# Patient Record
Sex: MALE | Race: OTHER | NOT HISPANIC OR LATINO | URBAN - METROPOLITAN AREA
[De-identification: names, ages, dates, MRNs, and addresses within clinical notes are randomized per-mention and may not be internally consistent; named-entity substitution may affect disease eponyms.]

---

## 2018-03-26 ENCOUNTER — EMERGENCY (EMERGENCY)
Facility: HOSPITAL | Age: 33
LOS: 1 days | Discharge: ANOTHER HOSPITAL | End: 2018-03-26
Attending: EMERGENCY MEDICINE | Admitting: EMERGENCY MEDICINE
Payer: MEDICAID

## 2018-03-26 VITALS
DIASTOLIC BLOOD PRESSURE: 83 MMHG | TEMPERATURE: 98 F | OXYGEN SATURATION: 99 % | RESPIRATION RATE: 18 BRPM | SYSTOLIC BLOOD PRESSURE: 153 MMHG | HEART RATE: 111 BPM

## 2018-03-26 DIAGNOSIS — Z95.810 PRESENCE OF AUTOMATIC (IMPLANTABLE) CARDIAC DEFIBRILLATOR: Chronic | ICD-10-CM

## 2018-03-26 DIAGNOSIS — Z95.0 PRESENCE OF CARDIAC PACEMAKER: Chronic | ICD-10-CM

## 2018-03-26 LAB
ALBUMIN SERPL ELPH-MCNC: 4.1 G/DL — SIGNIFICANT CHANGE UP (ref 3.3–5)
ALP SERPL-CCNC: 101 U/L — SIGNIFICANT CHANGE UP (ref 40–120)
ALT FLD-CCNC: 50 U/L — HIGH (ref 4–41)
AST SERPL-CCNC: 30 U/L — SIGNIFICANT CHANGE UP (ref 4–40)
BASOPHILS # BLD AUTO: 0.05 K/UL — SIGNIFICANT CHANGE UP (ref 0–0.2)
BASOPHILS NFR BLD AUTO: 0.4 % — SIGNIFICANT CHANGE UP (ref 0–2)
BILIRUB SERPL-MCNC: 0.3 MG/DL — SIGNIFICANT CHANGE UP (ref 0.2–1.2)
BUN SERPL-MCNC: 18 MG/DL — SIGNIFICANT CHANGE UP (ref 7–23)
CALCIUM SERPL-MCNC: 9.5 MG/DL — SIGNIFICANT CHANGE UP (ref 8.4–10.5)
CHLORIDE SERPL-SCNC: 99 MMOL/L — SIGNIFICANT CHANGE UP (ref 98–107)
CK MB BLD-MCNC: 0.8 — SIGNIFICANT CHANGE UP (ref 0–2.5)
CK MB BLD-MCNC: 2.17 NG/ML — SIGNIFICANT CHANGE UP (ref 1–6.6)
CK SERPL-CCNC: 269 U/L — HIGH (ref 30–200)
CO2 SERPL-SCNC: 22 MMOL/L — SIGNIFICANT CHANGE UP (ref 22–31)
CREAT SERPL-MCNC: 1.15 MG/DL — SIGNIFICANT CHANGE UP (ref 0.5–1.3)
EOSINOPHIL # BLD AUTO: 0.1 K/UL — SIGNIFICANT CHANGE UP (ref 0–0.5)
EOSINOPHIL NFR BLD AUTO: 0.7 % — SIGNIFICANT CHANGE UP (ref 0–6)
GLUCOSE SERPL-MCNC: 143 MG/DL — HIGH (ref 70–99)
HCT VFR BLD CALC: 47.2 % — SIGNIFICANT CHANGE UP (ref 39–50)
HGB BLD-MCNC: 15.5 G/DL — SIGNIFICANT CHANGE UP (ref 13–17)
IMM GRANULOCYTES # BLD AUTO: 0.06 # — SIGNIFICANT CHANGE UP
IMM GRANULOCYTES NFR BLD AUTO: 0.4 % — SIGNIFICANT CHANGE UP (ref 0–1.5)
LYMPHOCYTES # BLD AUTO: 2.81 K/UL — SIGNIFICANT CHANGE UP (ref 1–3.3)
LYMPHOCYTES # BLD AUTO: 20 % — SIGNIFICANT CHANGE UP (ref 13–44)
MAGNESIUM SERPL-MCNC: 1.9 MG/DL — SIGNIFICANT CHANGE UP (ref 1.6–2.6)
MCHC RBC-ENTMCNC: 27 PG — SIGNIFICANT CHANGE UP (ref 27–34)
MCHC RBC-ENTMCNC: 32.8 % — SIGNIFICANT CHANGE UP (ref 32–36)
MCV RBC AUTO: 82.1 FL — SIGNIFICANT CHANGE UP (ref 80–100)
MONOCYTES # BLD AUTO: 0.85 K/UL — SIGNIFICANT CHANGE UP (ref 0–0.9)
MONOCYTES NFR BLD AUTO: 6 % — SIGNIFICANT CHANGE UP (ref 2–14)
NEUTROPHILS # BLD AUTO: 10.19 K/UL — HIGH (ref 1.8–7.4)
NEUTROPHILS NFR BLD AUTO: 72.5 % — SIGNIFICANT CHANGE UP (ref 43–77)
NRBC # FLD: 0 — SIGNIFICANT CHANGE UP
PHOSPHATE SERPL-MCNC: 2.7 MG/DL — SIGNIFICANT CHANGE UP (ref 2.5–4.5)
PLATELET # BLD AUTO: 295 K/UL — SIGNIFICANT CHANGE UP (ref 150–400)
PMV BLD: 10.8 FL — SIGNIFICANT CHANGE UP (ref 7–13)
POTASSIUM SERPL-MCNC: 4.1 MMOL/L — SIGNIFICANT CHANGE UP (ref 3.5–5.3)
POTASSIUM SERPL-SCNC: 4.1 MMOL/L — SIGNIFICANT CHANGE UP (ref 3.5–5.3)
PROT SERPL-MCNC: 8 G/DL — SIGNIFICANT CHANGE UP (ref 6–8.3)
RBC # BLD: 5.75 M/UL — SIGNIFICANT CHANGE UP (ref 4.2–5.8)
RBC # FLD: 12.1 % — SIGNIFICANT CHANGE UP (ref 10.3–14.5)
SODIUM SERPL-SCNC: 138 MMOL/L — SIGNIFICANT CHANGE UP (ref 135–145)
TROPONIN T SERPL-MCNC: < 0.06 NG/ML — SIGNIFICANT CHANGE UP (ref 0–0.06)
WBC # BLD: 14.06 K/UL — HIGH (ref 3.8–10.5)
WBC # FLD AUTO: 14.06 K/UL — HIGH (ref 3.8–10.5)

## 2018-03-26 PROCEDURE — 71045 X-RAY EXAM CHEST 1 VIEW: CPT | Mod: 26

## 2018-03-26 PROCEDURE — 99285 EMERGENCY DEPT VISIT HI MDM: CPT

## 2018-03-26 RX ORDER — METOPROLOL TARTRATE 50 MG
5 TABLET ORAL ONCE
Qty: 0 | Refills: 0 | Status: COMPLETED | OUTPATIENT
Start: 2018-03-26 | End: 2018-03-26

## 2018-03-26 RX ADMIN — Medication 5 MILLIGRAM(S): at 20:14

## 2018-03-26 NOTE — ED PROVIDER NOTE - ATTENDING CONTRIBUTION TO CARE
DR. HOPPER, ATTENDING MD-  I performed a face to face bedside interview with patient regarding history of present illness, review of symptoms and past medical history. I completed an independent physical exam.  I have discussed patient's plan of care with the resident.   Documentation as above in the note.    Tomas: h/o cardiac myopathy of unclear etiology, "scarring" per patient, dx'ed on MRI after having PVCs seen on EKG 3 years ago (ARVD?), was in [facility] until ~ 1 month ago when his aicd fired.  Was seen by his cardiologist who put him on an outpatient monitor.  Had not fired again until today when it fired at home once, then on the way to the hospital.  No dizziness prior to or after the firing.  Did have some "palpitations" mild chest discomfrot prior to event.  EKG shows bigeminy, but otherwise no ishchemic changes.  Vtial signs otherwise stable.  On exam, comfortable, lungs clear, heart RRR, abd soft.  PlaN: check labs, including electrolytes and cardiac enzymes, cards eval, admit tele

## 2018-03-26 NOTE — ED PROVIDER NOTE - OBJECTIVE STATEMENT
32M PMH AICD/Pacemaker ([employer]) placed in 2012 for runs of V-tach ultimately felt to be caused by cardiac scar tissue of unknown etiology, followed at [facility], presenting after AICD fired x 2 today. Denies any preceeding symptoms. Takes metoprolol and lisinopril daily. AICD fired approx 1 month ago and underwent holter monitor and found to have had runs of vtach resulting in firing. No recent illness/fever/chills/chest pain/SOB. Denies any drug use, EtOH or other medical history.

## 2018-03-26 NOTE — ED PROVIDER NOTE - MEDICAL DECISION MAKING DETAILS
32M s/p AICD firing, currently in and out of [facility], well appearing with normal exam. Labs, mag/phos, cardiac enzymes, cxr, ekg, tele monitoring, tele admission.

## 2018-03-26 NOTE — ED ADULT NURSE NOTE - OBJECTIVE STATEMENT
Pt rec't to ED TR B Aox4, in NAD, c/o AICD firing x2 in past 40 minutes, while sitting in car. Endorses anxiety at this time, denies any CP/ SOB/; N/V/ cough/ recent illnesses/ other acute complaints. EKG complete, NSR-sinus tach on CM, respirations appear even and unlabored, IV inserted, BW collected and sent to lab, metoprolol administered as per EMAR. Awaiting test results. Will continue to monitor.

## 2018-03-26 NOTE — ED PROVIDER NOTE - SHIFT CHANGE DETAILS
awaiting bed at [facility].  all appropriate information has been transmitted to that hospital, discharge paperwork prepared. awaiting bed

## 2018-03-27 VITALS — SYSTOLIC BLOOD PRESSURE: 110 MMHG | DIASTOLIC BLOOD PRESSURE: 75 MMHG | HEART RATE: 76 BPM

## 2022-06-25 ENCOUNTER — EMERGENCY (EMERGENCY)
Facility: HOSPITAL | Age: 37
LOS: 1 days | Discharge: HOME | End: 2022-06-25
Attending: EMERGENCY MEDICINE
Payer: MEDICAID

## 2022-06-25 VITALS
DIASTOLIC BLOOD PRESSURE: 75 MMHG | OXYGEN SATURATION: 99 % | HEIGHT: 69 IN | RESPIRATION RATE: 18 BRPM | SYSTOLIC BLOOD PRESSURE: 116 MMHG | WEIGHT: 175.05 LBS | TEMPERATURE: 98 F | HEART RATE: 68 BPM

## 2022-06-25 DIAGNOSIS — Z95.810 PRESENCE OF AUTOMATIC (IMPLANTABLE) CARDIAC DEFIBRILLATOR: Chronic | ICD-10-CM

## 2022-06-25 DIAGNOSIS — Z95.0 PRESENCE OF CARDIAC PACEMAKER: Chronic | ICD-10-CM

## 2022-06-25 PROCEDURE — 93010 ELECTROCARDIOGRAM REPORT: CPT

## 2022-06-25 PROCEDURE — 99285 EMERGENCY DEPT VISIT HI MDM: CPT

## 2022-06-25 NOTE — ED PROVIDER NOTE - CLINICAL SUMMARY MEDICAL DECISION MAKING FREE TEXT BOX
35yo male v tach s/p ablation p/w palpitations. EKg with occasional PVC's. Eval for underlying electrolyte abnormality vs ACS. Tele monitoring, labs, reassess.

## 2022-06-25 NOTE — ED PROVIDER NOTE - NSICDXPASTSURGICALHX_GEN_ALL_CORE_FT
PAST SURGICAL HISTORY:  AICD (automatic cardioverter/defibrillator) present [employer] placed 6/2015    Pacemaker

## 2022-06-25 NOTE — ED PROVIDER NOTE - ATTENDING CONTRIBUTION TO CARE
attending Chyna: 36yM h/o VT s/p ablation and AICD, HTN, HLD p/w brief episode of palpitations tonight, since resolved. Currently asymptomatic. Denies chest pain.  Had catheterization 7 years ago that was normal per patient. Exam as above. Will obtain ekg, place on tele, labs including lytes and trop, reassess

## 2022-06-25 NOTE — ED PROVIDER NOTE - PATIENT PORTAL LINK FT
You can access the FollowMyHealth Patient Portal offered by [facility] by registering at the following website: [email or web address]. By joining [employer]’s FollowMyHealth portal, you will also be able to view your health information using other applications (apps) compatible with our system.

## 2022-06-25 NOTE — ED PROVIDER NOTE - PHYSICAL EXAMINATION
Physical Exam:  Gen: NAD, AOx3, non-toxic appearing, able to ambulate without assistance  Head: NCAT  HEENT: EOMI, PEERLA, normal conjunctiva, tongue midline, oral mucosa moist  Lung: speaking in full sentences  CV: RRR  MSK: no visible deformities, ROM normal in UE/LE  Skin: Warm, well perfused, no rash  Psych: normal affect, calm

## 2022-06-25 NOTE — ED PROVIDER NOTE - NSFOLLOWUPINSTRUCTIONS_ED_ALL_ED_FT
- Continue all regular medications  - Follow up with your EP doctor within 1 week  - You were given copies of labs and/or imaging results if applicable, please take them to your follow up appointments  - Return to the ER for chest pain, sustained palpitations or any worsening symptoms or concerns

## 2022-06-25 NOTE — ED PROVIDER NOTE - OBJECTIVE STATEMENT
37yo male VT s/p ablation and AICD, HTN, HLD p/w acute brief "heart racing" sensation lasting a few seconds around 530pm a/w L shoulder discomfort. Since resolved and feels well. He has noted his HR to be occasionally in 50's the past few days, takes metoprolol 100 mg BID. Was eating chinese food before palpitations episode and had 2 episodes of soft stools immediately after eating, cannot think of other provoking factors. FH CAD in mom in her 40's. Had catheterization 7 years ago that was normal per patient. denies fever, recent illness.

## 2022-06-25 NOTE — ED PROVIDER NOTE - NSICDXFAMILYHX_GEN_ALL_CORE_FT
FAMILY HISTORY:  Mother  Still living? No  Family history of acute myocardial infarction, Age at diagnosis: 41-50  Family history of stroke, Age at diagnosis: 41-50

## 2022-06-26 VITALS
TEMPERATURE: 98 F | DIASTOLIC BLOOD PRESSURE: 65 MMHG | OXYGEN SATURATION: 96 % | HEART RATE: 53 BPM | RESPIRATION RATE: 15 BRPM | SYSTOLIC BLOOD PRESSURE: 105 MMHG

## 2022-06-26 LAB
ALBUMIN SERPL ELPH-MCNC: 4.5 G/DL — SIGNIFICANT CHANGE UP (ref 3.3–5)
ALP SERPL-CCNC: 110 U/L — SIGNIFICANT CHANGE UP (ref 40–120)
ALT FLD-CCNC: 38 U/L — SIGNIFICANT CHANGE UP (ref 10–45)
ANION GAP SERPL CALC-SCNC: 14 MMOL/L — SIGNIFICANT CHANGE UP (ref 5–17)
AST SERPL-CCNC: 27 U/L — SIGNIFICANT CHANGE UP (ref 10–40)
BASOPHILS # BLD AUTO: 0.04 K/UL — SIGNIFICANT CHANGE UP (ref 0–0.2)
BASOPHILS NFR BLD AUTO: 0.5 % — SIGNIFICANT CHANGE UP (ref 0–2)
BILIRUB SERPL-MCNC: 0.3 MG/DL — SIGNIFICANT CHANGE UP (ref 0.2–1.2)
BUN SERPL-MCNC: 17 MG/DL — SIGNIFICANT CHANGE UP (ref 7–23)
CALCIUM SERPL-MCNC: 9.9 MG/DL — SIGNIFICANT CHANGE UP (ref 8.4–10.5)
CHLORIDE SERPL-SCNC: 105 MMOL/L — SIGNIFICANT CHANGE UP (ref 96–108)
CO2 SERPL-SCNC: 24 MMOL/L — SIGNIFICANT CHANGE UP (ref 22–31)
CREAT SERPL-MCNC: 1.13 MG/DL — SIGNIFICANT CHANGE UP (ref 0.5–1.3)
EGFR: 87 ML/MIN/1.73M2 — SIGNIFICANT CHANGE UP
EOSINOPHIL # BLD AUTO: 0.11 K/UL — SIGNIFICANT CHANGE UP (ref 0–0.5)
EOSINOPHIL NFR BLD AUTO: 1.4 % — SIGNIFICANT CHANGE UP (ref 0–6)
GLUCOSE SERPL-MCNC: 92 MG/DL — SIGNIFICANT CHANGE UP (ref 70–99)
HCT VFR BLD CALC: 48.1 % — SIGNIFICANT CHANGE UP (ref 39–50)
HGB BLD-MCNC: 15.7 G/DL — SIGNIFICANT CHANGE UP (ref 13–17)
IMM GRANULOCYTES NFR BLD AUTO: 0.3 % — SIGNIFICANT CHANGE UP (ref 0–1.5)
LYMPHOCYTES # BLD AUTO: 1.37 K/UL — SIGNIFICANT CHANGE UP (ref 1–3.3)
LYMPHOCYTES # BLD AUTO: 17.5 % — SIGNIFICANT CHANGE UP (ref 13–44)
MAGNESIUM SERPL-MCNC: 2.1 MG/DL — SIGNIFICANT CHANGE UP (ref 1.6–2.6)
MCHC RBC-ENTMCNC: 27.5 PG — SIGNIFICANT CHANGE UP (ref 27–34)
MCHC RBC-ENTMCNC: 32.6 GM/DL — SIGNIFICANT CHANGE UP (ref 32–36)
MCV RBC AUTO: 84.2 FL — SIGNIFICANT CHANGE UP (ref 80–100)
MONOCYTES # BLD AUTO: 0.68 K/UL — SIGNIFICANT CHANGE UP (ref 0–0.9)
MONOCYTES NFR BLD AUTO: 8.7 % — SIGNIFICANT CHANGE UP (ref 2–14)
NEUTROPHILS # BLD AUTO: 5.6 K/UL — SIGNIFICANT CHANGE UP (ref 1.8–7.4)
NEUTROPHILS NFR BLD AUTO: 71.6 % — SIGNIFICANT CHANGE UP (ref 43–77)
NRBC # BLD: 0 /100 WBCS — SIGNIFICANT CHANGE UP (ref 0–0)
NT-PROBNP SERPL-SCNC: 191 PG/ML — SIGNIFICANT CHANGE UP (ref 0–300)
PLATELET # BLD AUTO: 299 K/UL — SIGNIFICANT CHANGE UP (ref 150–400)
POTASSIUM SERPL-MCNC: 4.5 MMOL/L — SIGNIFICANT CHANGE UP (ref 3.5–5.3)
POTASSIUM SERPL-SCNC: 4.5 MMOL/L — SIGNIFICANT CHANGE UP (ref 3.5–5.3)
PROT SERPL-MCNC: 8.1 G/DL — SIGNIFICANT CHANGE UP (ref 6–8.3)
RBC # BLD: 5.71 M/UL — SIGNIFICANT CHANGE UP (ref 4.2–5.8)
RBC # FLD: 12.1 % — SIGNIFICANT CHANGE UP (ref 10.3–14.5)
SARS-COV-2 RNA SPEC QL NAA+PROBE: SIGNIFICANT CHANGE UP
SODIUM SERPL-SCNC: 143 MMOL/L — SIGNIFICANT CHANGE UP (ref 135–145)
TROPONIN T, HIGH SENSITIVITY RESULT: 21 NG/L — SIGNIFICANT CHANGE UP (ref 0–51)
TSH SERPL-MCNC: 2.49 UIU/ML — SIGNIFICANT CHANGE UP (ref 0.27–4.2)
WBC # BLD: 7.82 K/UL — SIGNIFICANT CHANGE UP (ref 3.8–10.5)
WBC # FLD AUTO: 7.82 K/UL — SIGNIFICANT CHANGE UP (ref 3.8–10.5)

## 2022-06-26 PROCEDURE — 83735 ASSAY OF MAGNESIUM: CPT

## 2022-06-26 PROCEDURE — 71045 X-RAY EXAM CHEST 1 VIEW: CPT | Mod: 26

## 2022-06-26 PROCEDURE — 99284 EMERGENCY DEPT VISIT MOD MDM: CPT | Mod: 25

## 2022-06-26 PROCEDURE — 87635 SARS-COV-2 COVID-19 AMP PRB: CPT

## 2022-06-26 PROCEDURE — 80053 COMPREHEN METABOLIC PANEL: CPT

## 2022-06-26 PROCEDURE — 85025 COMPLETE CBC W/AUTO DIFF WBC: CPT

## 2022-06-26 PROCEDURE — 93005 ELECTROCARDIOGRAM TRACING: CPT

## 2022-06-26 PROCEDURE — 71045 X-RAY EXAM CHEST 1 VIEW: CPT

## 2022-06-26 PROCEDURE — 83880 ASSAY OF NATRIURETIC PEPTIDE: CPT

## 2022-06-26 PROCEDURE — 84443 ASSAY THYROID STIM HORMONE: CPT

## 2022-06-26 PROCEDURE — 84484 ASSAY OF TROPONIN QUANT: CPT

## 2022-06-26 NOTE — ED ADULT NURSE NOTE - OBJECTIVE STATEMENT
36 y old male with PMH of vtach s/p ablation and AICD placement presents to the ED from home c/o palpitations x 1 day. Pt reports he has had intermittent episodes of "slow heart rate" and lightheadedness x 2 weeks and today had one episode of "racing heart rate." Pt currently denies palpitations, lightheadedness. Denies fevers, chills. Pt A&O x 4, ambulatory. VSS. Pt well appearing. EKG performed in ED. Pt placed on [facility]. Stretcher locked in lowest position, side rails up and call bell in reach.

## 2022-09-14 ENCOUNTER — OFFICE (OUTPATIENT)
Dept: URBAN - METROPOLITAN AREA CLINIC 90 | Facility: CLINIC | Age: 37
Setting detail: OPHTHALMOLOGY
End: 2022-09-14
Payer: COMMERCIAL

## 2022-09-14 DIAGNOSIS — H11.132: ICD-10-CM

## 2022-09-14 PROCEDURE — 99204 OFFICE O/P NEW MOD 45 MIN: CPT | Performed by: OPHTHALMOLOGY

## 2022-09-14 ASSESSMENT — REFRACTION_CURRENTRX
OD_SPHERE: -5.50
OD_OVR_VA: 20/
OS_CYLINDER: SPHERE
OD_VPRISM_DIRECTION: SV
OS_OVR_VA: 20/
OD_AXIS: 033
OS_VPRISM_DIRECTION: SV
OS_SPHERE: -5.50
OD_CYLINDER: -0.25

## 2022-09-14 ASSESSMENT — CONFRONTATIONAL VISUAL FIELD TEST (CVF)
OD_FINDINGS: FULL
OS_FINDINGS: FULL

## 2022-09-14 ASSESSMENT — AXIALLENGTH_DERIVED
OS_AL: 26.264
OD_AL: 26.2038
OS_AL: 26.3245
OD_AL: 26.4464
OS_AL: 26.2038
OD_AL: 26.2038

## 2022-09-14 ASSESSMENT — SPHEQUIV_DERIVED
OS_SPHEQUIV: -6.875
OD_SPHEQUIV: -7.125
OS_SPHEQUIV: -6.75
OS_SPHEQUIV: -6.625
OD_SPHEQUIV: -6.625
OD_SPHEQUIV: -6.625

## 2022-09-14 ASSESSMENT — REFRACTION_AUTOREFRACTION
OS_CYLINDER: -0.50
OD_SPHERE: -6.50
OD_AXIS: 034
OS_SPHERE: -6.50
OS_AXIS: 072
OD_CYLINDER: -0.25

## 2022-09-14 ASSESSMENT — REFRACTION_MANIFEST
OD_VA1: 20/20-2
OD_CYLINDER: -0.25
OS_SPHERE: -6.75
OS_AXIS: 075
OS_VA1: 20/20-1
OS_VA1: 20/20-1
OS_AXIS: 075
OS_SPHERE: -6.50
OS_CYLINDER: -0.25
OD_SPHERE: -6.50
OD_VA1: 20/20-2
OD_AXIS: 035
OD_AXIS: 035
OS_CYLINDER: -0.25
OD_CYLINDER: -0.25
OD_SPHERE: -7.00

## 2022-09-14 ASSESSMENT — KERATOMETRY
METHOD_AUTO_MANUAL: AUTO
OD_K1POWER_DIOPTERS: 44.00
OS_K2POWER_DIOPTERS: 44.25
OD_K2POWER_DIOPTERS: 44.25
OD_AXISANGLE_DEGREES: 112
OS_K1POWER_DIOPTERS: 44.00
OS_AXISANGLE_DEGREES: 082

## 2022-09-14 ASSESSMENT — TONOMETRY
OS_IOP_MMHG: 12
OD_IOP_MMHG: 10

## 2022-09-14 ASSESSMENT — VISUAL ACUITY
OD_BCVA: 20/60-2
OS_BCVA: 20/50-1

## 2022-10-12 PROBLEM — Z00.00 ENCOUNTER FOR PREVENTIVE HEALTH EXAMINATION: Status: ACTIVE | Noted: 2022-10-12

## 2022-10-13 ENCOUNTER — OUTPATIENT (OUTPATIENT)
Dept: OUTPATIENT SERVICES | Facility: HOSPITAL | Age: 37
LOS: 1 days | End: 2022-10-13
Payer: MEDICAID

## 2022-10-13 ENCOUNTER — RESULT REVIEW (OUTPATIENT)
Age: 37
End: 2022-10-13

## 2022-10-13 ENCOUNTER — APPOINTMENT (OUTPATIENT)
Dept: CT IMAGING | Facility: IMAGING CENTER | Age: 37
End: 2022-10-13

## 2022-10-13 ENCOUNTER — TRANSCRIPTION ENCOUNTER (OUTPATIENT)
Age: 37
End: 2022-10-13

## 2022-10-13 DIAGNOSIS — Z95.0 PRESENCE OF CARDIAC PACEMAKER: Chronic | ICD-10-CM

## 2022-10-13 DIAGNOSIS — Z00.8 ENCOUNTER FOR OTHER GENERAL EXAMINATION: ICD-10-CM

## 2022-10-13 DIAGNOSIS — Z95.810 PRESENCE OF AUTOMATIC (IMPLANTABLE) CARDIAC DEFIBRILLATOR: Chronic | ICD-10-CM

## 2022-10-13 PROCEDURE — 70450 CT HEAD/BRAIN W/O DYE: CPT

## 2022-10-13 PROCEDURE — 70450 CT HEAD/BRAIN W/O DYE: CPT | Mod: 26

## 2022-12-24 ENCOUNTER — INPATIENT (INPATIENT)
Facility: HOSPITAL | Age: 37
LOS: 4 days | Discharge: HOME | End: 2022-12-29
Attending: INTERNAL MEDICINE | Admitting: INTERNAL MEDICINE
Payer: MEDICAID

## 2022-12-24 VITALS — OXYGEN SATURATION: 100 %

## 2022-12-24 DIAGNOSIS — I47.20 VENTRICULAR TACHYCARDIA, UNSPECIFIED: ICD-10-CM

## 2022-12-24 DIAGNOSIS — Z95.810 PRESENCE OF AUTOMATIC (IMPLANTABLE) CARDIAC DEFIBRILLATOR: Chronic | ICD-10-CM

## 2022-12-24 DIAGNOSIS — Z95.0 PRESENCE OF CARDIAC PACEMAKER: Chronic | ICD-10-CM

## 2022-12-24 LAB
ALBUMIN SERPL ELPH-MCNC: 4.4 G/DL — SIGNIFICANT CHANGE UP (ref 3.3–5)
ALP SERPL-CCNC: 92 U/L — SIGNIFICANT CHANGE UP (ref 40–120)
ALT FLD-CCNC: 35 U/L — SIGNIFICANT CHANGE UP (ref 4–41)
ANION GAP SERPL CALC-SCNC: 16 MMOL/L — HIGH (ref 7–14)
AST SERPL-CCNC: 28 U/L — SIGNIFICANT CHANGE UP (ref 4–40)
BASE EXCESS BLDV CALC-SCNC: -1.3 MMOL/L — SIGNIFICANT CHANGE UP (ref -2–3)
BASOPHILS # BLD AUTO: 0.02 K/UL — SIGNIFICANT CHANGE UP (ref 0–0.2)
BASOPHILS NFR BLD AUTO: 0.2 % — SIGNIFICANT CHANGE UP (ref 0–2)
BILIRUB SERPL-MCNC: 0.6 MG/DL — SIGNIFICANT CHANGE UP (ref 0.2–1.2)
BLOOD GAS VENOUS COMPREHENSIVE RESULT: SIGNIFICANT CHANGE UP
BUN SERPL-MCNC: 15 MG/DL — SIGNIFICANT CHANGE UP (ref 7–23)
CALCIUM SERPL-MCNC: 9.6 MG/DL — SIGNIFICANT CHANGE UP (ref 8.4–10.5)
CHLORIDE BLDV-SCNC: 102 MMOL/L — SIGNIFICANT CHANGE UP (ref 96–108)
CHLORIDE SERPL-SCNC: 97 MMOL/L — LOW (ref 98–107)
CO2 BLDV-SCNC: 24.9 MMOL/L — SIGNIFICANT CHANGE UP (ref 22–26)
CO2 SERPL-SCNC: 21 MMOL/L — LOW (ref 22–31)
CREAT SERPL-MCNC: 1.22 MG/DL — SIGNIFICANT CHANGE UP (ref 0.5–1.3)
EGFR: 79 ML/MIN/1.73M2 — SIGNIFICANT CHANGE UP
EOSINOPHIL # BLD AUTO: 0.02 K/UL — SIGNIFICANT CHANGE UP (ref 0–0.5)
EOSINOPHIL NFR BLD AUTO: 0.2 % — SIGNIFICANT CHANGE UP (ref 0–6)
GAS PNL BLDV: 132 MMOL/L — LOW (ref 136–145)
GLUCOSE BLDV-MCNC: 186 MG/DL — HIGH (ref 70–99)
GLUCOSE SERPL-MCNC: 142 MG/DL — HIGH (ref 70–99)
HCO3 BLDV-SCNC: 24 MMOL/L — SIGNIFICANT CHANGE UP (ref 22–29)
HCT VFR BLD CALC: 41.9 % — SIGNIFICANT CHANGE UP (ref 39–50)
HCT VFR BLDA CALC: 39 % — SIGNIFICANT CHANGE UP (ref 39–51)
HGB BLD CALC-MCNC: 13.1 G/DL — SIGNIFICANT CHANGE UP (ref 13–17)
HGB BLD-MCNC: 14 G/DL — SIGNIFICANT CHANGE UP (ref 13–17)
IANC: 7.05 K/UL — SIGNIFICANT CHANGE UP (ref 1.8–7.4)
IMM GRANULOCYTES NFR BLD AUTO: 0.2 % — SIGNIFICANT CHANGE UP (ref 0–0.9)
LACTATE BLDV-MCNC: 3.1 MMOL/L — HIGH (ref 0.5–2)
LYMPHOCYTES # BLD AUTO: 32.7 % — SIGNIFICANT CHANGE UP (ref 13–44)
LYMPHOCYTES # BLD AUTO: 4.06 K/UL — HIGH (ref 1–3.3)
MAGNESIUM SERPL-MCNC: 2 MG/DL — SIGNIFICANT CHANGE UP (ref 1.6–2.6)
MCHC RBC-ENTMCNC: 27.1 PG — SIGNIFICANT CHANGE UP (ref 27–34)
MCHC RBC-ENTMCNC: 33.4 GM/DL — SIGNIFICANT CHANGE UP (ref 32–36)
MCV RBC AUTO: 81.2 FL — SIGNIFICANT CHANGE UP (ref 80–100)
MONOCYTES # BLD AUTO: 1.22 K/UL — HIGH (ref 0–0.9)
MONOCYTES NFR BLD AUTO: 9.8 % — SIGNIFICANT CHANGE UP (ref 2–14)
NEUTROPHILS # BLD AUTO: 7.05 K/UL — SIGNIFICANT CHANGE UP (ref 1.8–7.4)
NEUTROPHILS NFR BLD AUTO: 56.9 % — SIGNIFICANT CHANGE UP (ref 43–77)
NRBC # BLD: 0 /100 WBCS — SIGNIFICANT CHANGE UP (ref 0–0)
NRBC # FLD: 0 K/UL — SIGNIFICANT CHANGE UP (ref 0–0)
NT-PROBNP SERPL-SCNC: 290 PG/ML — SIGNIFICANT CHANGE UP
PCO2 BLDV: 40 MMHG — LOW (ref 42–55)
PH BLDV: 7.38 — SIGNIFICANT CHANGE UP (ref 7.32–7.43)
PLATELET # BLD AUTO: 268 K/UL — SIGNIFICANT CHANGE UP (ref 150–400)
PO2 BLDV: 45 MMHG — SIGNIFICANT CHANGE UP
POTASSIUM BLDV-SCNC: 3.1 MMOL/L — LOW (ref 3.5–5.1)
POTASSIUM SERPL-MCNC: 3.1 MMOL/L — LOW (ref 3.5–5.3)
POTASSIUM SERPL-SCNC: 3.1 MMOL/L — LOW (ref 3.5–5.3)
PROT SERPL-MCNC: 8.1 G/DL — SIGNIFICANT CHANGE UP (ref 6–8.3)
RBC # BLD: 5.16 M/UL — SIGNIFICANT CHANGE UP (ref 4.2–5.8)
RBC # FLD: 12.2 % — SIGNIFICANT CHANGE UP (ref 10.3–14.5)
SAO2 % BLDV: 76.9 % — SIGNIFICANT CHANGE UP
SODIUM SERPL-SCNC: 134 MMOL/L — LOW (ref 135–145)
TROPONIN T, HIGH SENSITIVITY RESULT: 36 NG/L — SIGNIFICANT CHANGE UP
WBC # BLD: 12.4 K/UL — HIGH (ref 3.8–10.5)
WBC # FLD AUTO: 12.4 K/UL — HIGH (ref 3.8–10.5)

## 2022-12-24 PROCEDURE — 99291 CRITICAL CARE FIRST HOUR: CPT

## 2022-12-24 RX ORDER — METOPROLOL TARTRATE 50 MG
5 TABLET ORAL ONCE
Refills: 0 | Status: COMPLETED | OUTPATIENT
Start: 2022-12-24 | End: 2022-12-24

## 2022-12-24 RX ORDER — AMIODARONE HYDROCHLORIDE 400 MG/1
150 TABLET ORAL ONCE
Refills: 0 | Status: DISCONTINUED | OUTPATIENT
Start: 2022-12-24 | End: 2022-12-25

## 2022-12-24 RX ORDER — LIDOCAINE HCL 20 MG/ML
100 VIAL (ML) INJECTION ONCE
Refills: 0 | Status: COMPLETED | OUTPATIENT
Start: 2022-12-24 | End: 2022-12-24

## 2022-12-24 RX ORDER — CHLORHEXIDINE GLUCONATE 213 G/1000ML
1 SOLUTION TOPICAL
Refills: 0 | Status: DISCONTINUED | OUTPATIENT
Start: 2022-12-24 | End: 2022-12-29

## 2022-12-24 RX ORDER — AMIODARONE HYDROCHLORIDE 400 MG/1
150 TABLET ORAL ONCE
Refills: 0 | Status: COMPLETED | OUTPATIENT
Start: 2022-12-24 | End: 2022-12-24

## 2022-12-24 RX ORDER — FENTANYL CITRATE 50 UG/ML
50 INJECTION INTRAVENOUS ONCE
Refills: 0 | Status: DISCONTINUED | OUTPATIENT
Start: 2022-12-24 | End: 2022-12-24

## 2022-12-24 RX ORDER — LIDOCAINE HCL 20 MG/ML
1 VIAL (ML) INJECTION
Qty: 2 | Refills: 0 | Status: DISCONTINUED | OUTPATIENT
Start: 2022-12-24 | End: 2022-12-26

## 2022-12-24 RX ORDER — AMIODARONE HYDROCHLORIDE 400 MG/1
1 TABLET ORAL
Qty: 450 | Refills: 0 | Status: DISCONTINUED | OUTPATIENT
Start: 2022-12-24 | End: 2022-12-25

## 2022-12-24 RX ORDER — CHLORHEXIDINE GLUCONATE 213 G/1000ML
1 SOLUTION TOPICAL
Refills: 0 | Status: DISCONTINUED | OUTPATIENT
Start: 2022-12-24 | End: 2022-12-25

## 2022-12-24 RX ORDER — AMIODARONE HYDROCHLORIDE 400 MG/1
150 TABLET ORAL ONCE
Refills: 0 | Status: DISCONTINUED | OUTPATIENT
Start: 2022-12-24 | End: 2022-12-24

## 2022-12-24 RX ADMIN — Medication 100 MILLIGRAM(S): at 22:56

## 2022-12-24 RX ADMIN — FENTANYL CITRATE 50 MICROGRAM(S): 50 INJECTION INTRAVENOUS at 23:14

## 2022-12-24 RX ADMIN — AMIODARONE HYDROCHLORIDE 618 MILLIGRAM(S): 400 TABLET ORAL at 22:54

## 2022-12-24 RX ADMIN — Medication 1 MILLIGRAM(S): at 22:53

## 2022-12-24 RX ADMIN — Medication 5 MILLIGRAM(S): at 22:42

## 2022-12-24 RX ADMIN — Medication 15 MG/MIN: at 22:55

## 2022-12-24 RX ADMIN — AMIODARONE HYDROCHLORIDE 33.3 MG/MIN: 400 TABLET ORAL at 23:17

## 2022-12-24 RX ADMIN — FENTANYL CITRATE 50 MICROGRAM(S): 50 INJECTION INTRAVENOUS at 22:44

## 2022-12-24 RX ADMIN — Medication 100 MILLIGRAM(S): at 22:42

## 2022-12-24 NOTE — ED PROVIDER NOTE - OBJECTIVE STATEMENT
36yo M pmh Ventricular tachycardia (s/p ablation and AICD ~6yrs ago) presenting from home for multiple episodes of AICD firing. Patient reports roughly 30 minutes ago was sitting watching TV when he felt his AICD fire, since then has had AICD firing every minute.  Chest pain only when AICD fires, has not had any chest pain or palpitations in the week leading up to the events today.  Does report getting COVID-19 vaccine yesterday.  Cardiologist/EP: Dr. Arteaga ([facility])

## 2022-12-24 NOTE — ED PROVIDER NOTE - PROGRESS NOTE DETAILS
Ronal Howard, PGY-3: Pt reexamined at bedside, resting comfortably, vitals stable. 100mg lidocaine given and bolus started, with reduced ectopy thereafter. EP consulted, has seen patient, will interrogate AICD. Ronal Howard, PGY-3: Patient is continue to have runs of V. tach, given 5 metoprolol and another 100 bolus of lidocaine.  As per CCU patient to be admitted.  Patient remained stable.  Interrogation of AICD demonstrated over 22 shocks delivered.

## 2022-12-24 NOTE — H&P ADULT - NSHPPHYSICALEXAM_GEN_ALL_CORE
T(C): 37.2 (12-24-22 @ 22:54), Max: 37.2 (12-24-22 @ 22:54)  HR: 95 (12-24-22 @ 23:49) (95 - 107)  BP: 120/76 (12-24-22 @ 23:49) (117/71 - 135/89)  RR: 18 (12-24-22 @ 23:49) (18 - 18)  SpO2: 100% (12-24-22 @ 23:49) (100% - 100%)    CONSTITUTIONAL: Well groomed, no apparent distress  EYES: PERRLA and symmetric, EOMI, No conjunctival or scleral injection, non-icteric  ENMT: Oral mucosa with moist membranes. Normal dentition; no pharyngeal injection or exudates             NECK: Supple, symmetric and without tracheal deviation   RESP: No respiratory distress, no use of accessory muscles; CTA b/l, no WRR  CV: RRR, +S1S2, no MRG; no JVD; no peripheral edema  GI: Soft, NT, ND, no rebound, no guarding; no palpable masses; no hepatosplenomegaly; no hernia palpated  LYMPH: No cervical LAD or tenderness; no axillary LAD or tenderness; no inguinal LAD or tenderness  MSK: Normal gait; No digital clubbing or cyanosis; examination of the (head/neck/spine/ribs/pelvis, RUE, LUE, RLE, LLE) without misalignment,            Normal ROM without pain, no spinal tenderness, normal muscle strength/tone  SKIN: No rashes or ulcers noted; no subcutaneous nodules or induration palpable  NEURO: CN II-XII intact; normal reflexes in upper and lower extremities, sensation intact in upper and lower extremities b/l to light touch   PSYCH: Appropriate insight/judgment; A+O x 3, mood and affect appropriate, recent/remote memory intact

## 2022-12-24 NOTE — ED PROVIDER NOTE - ATTENDING CONTRIBUTION TO CARE
Brief HPI:  38 yo M pmh Ventricular tachycardia (s/p ablation and AICD ~6yrs ago) presenting from home for multiple episodes of AICD firing.  Patient received bivalent sars cov 2 booster one day ago.  Denies fever, chills, syncope, cp between shocks, sob.      Vitals:   Reviewed    Exam:    GEN:  Non-toxic appearing, non-distressed, speaking full sentences, non-diaphoretic, AAOx3  HEENT:  NCAT, neck supple, EOMI, PERRLA, sclera anicteric, no conjunctival pallor or injection, no stridor, normal voice, no tonsillar exudate, uvula midline  CV:  tachy, regular, s1/s2 audible, no murmurs, rubs or gallops, peripheral pulses 2+ and symmetric  PULM:  non-labored respirations, lungs clear to auscultation bilaterally, no wheezes, crackles or rales  ABD:  non distended, non-tender, no rebound, no guarding, negative Hightower's sign, bowel sounds normal, no cvat  MSK:  no gross deformity, non-tender extremities and joints, range of motion grossly normal appearing, no extremity edema, extremities warm and well perfused   NEURO:  AAOx3, CN II-XII intact, motor 5/5 in upper and lower extremities bilaterally, sensation grossly intact in extremities and trunk, finger to nose testing wnl, no nystagmus, negative Romberg, no pronator drift, no gait deficit  SKIN:  warm, dry, no rash or vesicles     A/P:  38 yo M pmh Ventricular tachycardia (s/p ablation and AICD ~6yrs ago) presenting from home for multiple episodes of AICD firing.  Tachy.  On tele with runs of v-tach ablated with shocks and atp episodes.  Patient given 100 mg lidocaine IV follow by drip with improvement in ectopy, however patient had recurrent episodes approximately fifteen minutes after infusion started.  Zoll pads placed.   EP NP came to bedside and interrogated device with noted events.  Patient given additional IV lidocaine, IV amio push and drip, fentanyl, ativan.  Had observed improvement in ectopy.  Mentating well and well perfused.  Patient to be admitted to CCU for further care.

## 2022-12-24 NOTE — H&P ADULT - ASSESSMENT
37M PMH VT Ventricular tachycardia (s/p ablation and AICD SJ 2015 likely from ventricular scarring) accepted to CCU for VT Storm.

## 2022-12-24 NOTE — ED ADULT NURSE NOTE - OBJECTIVE STATEMENT
Patient is awake, A&O x 3, appears uncomfortable, brought in from home by EMS for AICD firing. He was watching tv 30 mins prior to arriving at ED. AICD fired 30 times per patient prior to EMS arriving, 4 times during the ambulance transport and 2 times in the ED. Complaining of chest and left arm pain. States, he received the AICD for runs of V tach. Seen in the past at [place] for AICD firing. Connected to cardiac monitor, zoll attached with pads, 20g IV right AC and right hand. MD at bedside. Patient doesn't have any other medical history.

## 2022-12-24 NOTE — ED PROVIDER NOTE - CLINICAL SUMMARY MEDICAL DECISION MAKING FREE TEXT BOX
CHECK ONBASE FOR ATTACHMENT: Colonoscopy.pdf    Please click on link to see image.     37-year-old male with V. tach status post ablation and AICD presenting with multiple episodes of AICD firing.  Has had over 3 firings as well as for witnessed here in the ED.  Patient currently stable after bolus of lidocaine infusion.  EP is consulted.  Triggering event may be vaccine yesterday, consider new onset cardiomyopathy or heart failure.  Patient currently well in no respiratory distress with stable vitals and heart rate in the 100s.

## 2022-12-24 NOTE — H&P ADULT - HISTORY OF PRESENT ILLNESS
This is a 37 year old with past medical history of Ventricular tachycardia (s/p ablation and AICD SJ 2015 likely from ventricular scarring) presented from home for multiple episodes of AICD firing that began at 9:30pm while watching TV. In ED patient initially received Lidocaine bolus and Lidocaine gtt 2mcg/min.  Preliminary interrogation of ICD shows 60 VT/VF with 58 ATP and 20 ICD shocks.  Julianne had 2 subsequent shocks in ED.  Additional lidocaine bolus given with Metoprolol 5mg IVP, Amiodarone 300mg and started on Amiodarone gtt.  Patient received Fentanyl 50mcg and Magnesium 2mg.  Patient reports he had a Moderna Covid Booster on 12/23/22 and woke up with maliase, sleeping most of day.  he denies fever, chills, cough, recent sick contact, syncope, SOB, BAXTER, N/V/D.  Patient reports he is under the care of Dr. Arteaga at [place] and last saw him early this month.  He report he had 1 previous firing 6-7 years ago with no subsequent firing until now.     On assessment, patient appears anxious, A+OX3, , /71, temp 99.0F, euvolemic, lungs sounds clear in all fields, rr 16, No JVD, No pedal edema.  Labs show mild leukocytosis with WBC 12.5, no anemia, creatinine 1.22 Na 134, K+ 3.1 Lactate 3.1. This is a 37 year old with past medical history of Ventricular tachycardia (s/p ablation and AICD SJ 2015 likely from ventricular scarring) presented from home for multiple episodes of AICD firing that began at 9:30pm while watching TV. In ED patient initially received Lidocaine bolus and Lidocaine gtt 2mcg/min.  Preliminary interrogation of ICD shows 60 VT/VF with 58 ATP and 20 ICD shocks. Rhythm VTach 200s with unsuccessful ATPs followed by shock.  ECG  with frequent PVCs.  Patient had 2 subsequent shocks in ED.  Additional lidocaine bolus given with Metoprolol 5mg IVP, Amiodarone 300mg and started on Amiodarone gtt.  Patient received Fentanyl 50mcg and Magnesium 2mg.  Patient reports he had a Moderna Covid Booster on 12/23/22 and woke up with maliase, sleeping most of day.  He denies fever, chills, cough, recent sick contact, syncope, SOB, BAXTER, N/V/D.  Patient reports he is under the care of Dr. Arteaga at [place] and last saw him early this month.  He report he had 1 previous firing 6-7 years ago with no subsequent firing until now.     On assessment, patient appears anxious, A+OX3, , /71, temp 99.0F, euvolemic, lungs sounds clear in all fields, rr 16, No JVD, No pedal edema.  Labs show mild leukocytosis with WBC 12.5, no anemia, creatinine 1.22 Na 134, K+ 3.1 Lactate 3.1.

## 2022-12-24 NOTE — ED PROVIDER NOTE - PHYSICAL EXAMINATION
GENERAL: non-toxic appearing, alert, in NAD  HEENT: atraumatic, normocephalic, Vision grossly intact, no conjunctivitis or discharge, hearing grossly intact,  no nasal discharge, epistaxis   CARDIAC: Tachy, regular, pulses 2+ throughout, no appreciable murmurs, no cyanosis, cap refill < 2 seconds  PULM: normal work of breathing, oxygen saturation on RA wnl, CTAB, no crackles, rales, rhonchi, or wheezing  GI: abdomen nondistended, soft, nontender, no guarding or rebound tenderness, no palpable masses  NEURO: awake and alert, follows commands, normal speech, PERRLA, EOMI, no focal motor or sensory deficits  MSK: spine appears normal, no joint swelling or erythema, ranging all extremities with no appreciable loss of ROM  EXT: no peripheral edema, calf tenderness, redness or swelling  SKIN: warm, dry, and intact, no rashes  PSYCH: anxious

## 2022-12-24 NOTE — H&P ADULT - PROBLEM SELECTOR PLAN 1
admit to CCU  Continue Lidocaine gtt 2mcg/min  Continue Amiodarone gtt 1mg/min for 6 hours then 0.5mg/min for 18 hours  Increase Metoprolol to 50mg q8h hold for HR <60 SBP <100  Monitor electrolytes q12h and replete to maintain K >4 and Mag >2  TTE to evaluate LV function admit to CCU  Place CVC  Continue Lidocaine gtt 2mcg/min  Continue Amiodarone gtt 1mg/min for 6 hours then 0.5mg/min for 18 hours  Increase Metoprolol to 50mg q8h hold for HR <60 SBP <100  Monitor electrolytes q12h and replete to maintain K >4 and Mag >2  TTE to evaluate LV function  EP informed

## 2022-12-25 LAB
ANION GAP SERPL CALC-SCNC: 9 MMOL/L — SIGNIFICANT CHANGE UP (ref 7–14)
APPEARANCE UR: CLEAR — SIGNIFICANT CHANGE UP
APTT BLD: 25.9 SEC — LOW (ref 27–36.3)
APTT BLD: 27.4 SEC — SIGNIFICANT CHANGE UP (ref 27–36.3)
B PERT DNA SPEC QL NAA+PROBE: SIGNIFICANT CHANGE UP
B PERT+PARAPERT DNA PNL SPEC NAA+PROBE: SIGNIFICANT CHANGE UP
BILIRUB UR-MCNC: NEGATIVE — SIGNIFICANT CHANGE UP
BLOOD GAS VENOUS COMPREHENSIVE RESULT: SIGNIFICANT CHANGE UP
BORDETELLA PARAPERTUSSIS (RAPRVP): SIGNIFICANT CHANGE UP
BUN SERPL-MCNC: 13 MG/DL — SIGNIFICANT CHANGE UP (ref 7–23)
C PNEUM DNA SPEC QL NAA+PROBE: SIGNIFICANT CHANGE UP
CALCIUM SERPL-MCNC: 9 MG/DL — SIGNIFICANT CHANGE UP (ref 8.4–10.5)
CHLORIDE SERPL-SCNC: 104 MMOL/L — SIGNIFICANT CHANGE UP (ref 98–107)
CO2 SERPL-SCNC: 24 MMOL/L — SIGNIFICANT CHANGE UP (ref 22–31)
COLOR SPEC: COLORLESS — SIGNIFICANT CHANGE UP
CREAT SERPL-MCNC: 1.1 MG/DL — SIGNIFICANT CHANGE UP (ref 0.5–1.3)
DIFF PNL FLD: NEGATIVE — SIGNIFICANT CHANGE UP
EGFR: 89 ML/MIN/1.73M2 — SIGNIFICANT CHANGE UP
FLUAV SUBTYP SPEC NAA+PROBE: SIGNIFICANT CHANGE UP
FLUBV RNA SPEC QL NAA+PROBE: SIGNIFICANT CHANGE UP
GLUCOSE SERPL-MCNC: 135 MG/DL — HIGH (ref 70–99)
GLUCOSE UR QL: NEGATIVE — SIGNIFICANT CHANGE UP
HADV DNA SPEC QL NAA+PROBE: SIGNIFICANT CHANGE UP
HCOV 229E RNA SPEC QL NAA+PROBE: SIGNIFICANT CHANGE UP
HCOV HKU1 RNA SPEC QL NAA+PROBE: SIGNIFICANT CHANGE UP
HCOV NL63 RNA SPEC QL NAA+PROBE: SIGNIFICANT CHANGE UP
HCOV OC43 RNA SPEC QL NAA+PROBE: SIGNIFICANT CHANGE UP
HCT VFR BLD CALC: 41.7 % — SIGNIFICANT CHANGE UP (ref 39–50)
HGB BLD-MCNC: 13.7 G/DL — SIGNIFICANT CHANGE UP (ref 13–17)
HMPV RNA SPEC QL NAA+PROBE: SIGNIFICANT CHANGE UP
HPIV1 RNA SPEC QL NAA+PROBE: SIGNIFICANT CHANGE UP
HPIV2 RNA SPEC QL NAA+PROBE: SIGNIFICANT CHANGE UP
HPIV3 RNA SPEC QL NAA+PROBE: SIGNIFICANT CHANGE UP
HPIV4 RNA SPEC QL NAA+PROBE: SIGNIFICANT CHANGE UP
INR BLD: 1.32 RATIO — HIGH (ref 0.88–1.16)
INR BLD: 1.32 RATIO — HIGH (ref 0.88–1.16)
KETONES UR-MCNC: NEGATIVE — SIGNIFICANT CHANGE UP
LEUKOCYTE ESTERASE UR-ACNC: NEGATIVE — SIGNIFICANT CHANGE UP
M PNEUMO DNA SPEC QL NAA+PROBE: SIGNIFICANT CHANGE UP
MAGNESIUM SERPL-MCNC: 2.2 MG/DL — SIGNIFICANT CHANGE UP (ref 1.6–2.6)
MCHC RBC-ENTMCNC: 26.7 PG — LOW (ref 27–34)
MCHC RBC-ENTMCNC: 32.9 GM/DL — SIGNIFICANT CHANGE UP (ref 32–36)
MCV RBC AUTO: 81.3 FL — SIGNIFICANT CHANGE UP (ref 80–100)
NITRITE UR-MCNC: NEGATIVE — SIGNIFICANT CHANGE UP
NRBC # BLD: 0 /100 WBCS — SIGNIFICANT CHANGE UP (ref 0–0)
NRBC # FLD: 0 K/UL — SIGNIFICANT CHANGE UP (ref 0–0)
PH UR: 7 — SIGNIFICANT CHANGE UP (ref 5–8)
PHOSPHATE SERPL-MCNC: 1.7 MG/DL — LOW (ref 2.5–4.5)
PLATELET # BLD AUTO: 238 K/UL — SIGNIFICANT CHANGE UP (ref 150–400)
POTASSIUM SERPL-MCNC: 5 MMOL/L — SIGNIFICANT CHANGE UP (ref 3.5–5.3)
POTASSIUM SERPL-SCNC: 5 MMOL/L — SIGNIFICANT CHANGE UP (ref 3.5–5.3)
PROT UR-MCNC: NEGATIVE — SIGNIFICANT CHANGE UP
PROTHROM AB SERPL-ACNC: 15.4 SEC — HIGH (ref 10.5–13.4)
PROTHROM AB SERPL-ACNC: 15.4 SEC — HIGH (ref 10.5–13.4)
RAPID RVP RESULT: SIGNIFICANT CHANGE UP
RBC # BLD: 5.13 M/UL — SIGNIFICANT CHANGE UP (ref 4.2–5.8)
RBC # FLD: 11.9 % — SIGNIFICANT CHANGE UP (ref 10.3–14.5)
RSV RNA SPEC QL NAA+PROBE: SIGNIFICANT CHANGE UP
RV+EV RNA SPEC QL NAA+PROBE: SIGNIFICANT CHANGE UP
SARS-COV-2 RNA SPEC QL NAA+PROBE: SIGNIFICANT CHANGE UP
SODIUM SERPL-SCNC: 137 MMOL/L — SIGNIFICANT CHANGE UP (ref 135–145)
SP GR SPEC: 1.01 — LOW (ref 1.01–1.05)
TSH SERPL-MCNC: 1.66 UIU/ML — SIGNIFICANT CHANGE UP (ref 0.27–4.2)
UROBILINOGEN FLD QL: SIGNIFICANT CHANGE UP
WBC # BLD: 10.22 K/UL — SIGNIFICANT CHANGE UP (ref 3.8–10.5)
WBC # FLD AUTO: 10.22 K/UL — SIGNIFICANT CHANGE UP (ref 3.8–10.5)

## 2022-12-25 PROCEDURE — 99291 CRITICAL CARE FIRST HOUR: CPT | Mod: GC

## 2022-12-25 PROCEDURE — 36569 INSJ PICC 5 YR+ W/O IMAGING: CPT

## 2022-12-25 PROCEDURE — 76937 US GUIDE VASCULAR ACCESS: CPT | Mod: 26,59

## 2022-12-25 PROCEDURE — 93306 TTE W/DOPPLER COMPLETE: CPT | Mod: 26

## 2022-12-25 PROCEDURE — 99223 1ST HOSP IP/OBS HIGH 75: CPT

## 2022-12-25 PROCEDURE — 71045 X-RAY EXAM CHEST 1 VIEW: CPT | Mod: 26

## 2022-12-25 RX ORDER — AMIODARONE HYDROCHLORIDE 400 MG/1
0.5 TABLET ORAL
Qty: 450 | Refills: 0 | Status: DISCONTINUED | OUTPATIENT
Start: 2022-12-25 | End: 2022-12-26

## 2022-12-25 RX ORDER — ATORVASTATIN CALCIUM 80 MG/1
10 TABLET, FILM COATED ORAL AT BEDTIME
Refills: 0 | Status: DISCONTINUED | OUTPATIENT
Start: 2022-12-25 | End: 2022-12-29

## 2022-12-25 RX ORDER — AMIODARONE HYDROCHLORIDE 400 MG/1
0.5 TABLET ORAL
Qty: 450 | Refills: 0 | Status: DISCONTINUED | OUTPATIENT
Start: 2022-12-25 | End: 2022-12-25

## 2022-12-25 RX ORDER — ASPIRIN/CALCIUM CARB/MAGNESIUM 324 MG
81 TABLET ORAL DAILY
Refills: 0 | Status: DISCONTINUED | OUTPATIENT
Start: 2022-12-25 | End: 2022-12-29

## 2022-12-25 RX ORDER — SODIUM CHLORIDE 9 MG/ML
500 INJECTION, SOLUTION INTRAVENOUS
Refills: 0 | Status: DISCONTINUED | OUTPATIENT
Start: 2022-12-25 | End: 2022-12-25

## 2022-12-25 RX ORDER — HEPARIN SODIUM 5000 [USP'U]/ML
5000 INJECTION INTRAVENOUS; SUBCUTANEOUS EVERY 8 HOURS
Refills: 0 | Status: DISCONTINUED | OUTPATIENT
Start: 2022-12-25 | End: 2022-12-27

## 2022-12-25 RX ORDER — FENTANYL CITRATE 50 UG/ML
25 INJECTION INTRAVENOUS ONCE
Refills: 0 | Status: DISCONTINUED | OUTPATIENT
Start: 2022-12-25 | End: 2022-12-25

## 2022-12-25 RX ORDER — LISINOPRIL 2.5 MG/1
2.5 TABLET ORAL DAILY
Refills: 0 | Status: DISCONTINUED | OUTPATIENT
Start: 2022-12-25 | End: 2022-12-25

## 2022-12-25 RX ORDER — MAGNESIUM SULFATE 500 MG/ML
2 VIAL (ML) INJECTION ONCE
Refills: 0 | Status: COMPLETED | OUTPATIENT
Start: 2022-12-25 | End: 2022-12-25

## 2022-12-25 RX ORDER — POTASSIUM CHLORIDE 20 MEQ
40 PACKET (EA) ORAL ONCE
Refills: 0 | Status: COMPLETED | OUTPATIENT
Start: 2022-12-25 | End: 2022-12-25

## 2022-12-25 RX ORDER — METOPROLOL TARTRATE 50 MG
50 TABLET ORAL EVERY 8 HOURS
Refills: 0 | Status: DISCONTINUED | OUTPATIENT
Start: 2022-12-25 | End: 2022-12-26

## 2022-12-25 RX ORDER — AMIODARONE HYDROCHLORIDE 400 MG/1
400 TABLET ORAL THREE TIMES A DAY
Refills: 0 | Status: DISCONTINUED | OUTPATIENT
Start: 2022-12-25 | End: 2022-12-25

## 2022-12-25 RX ORDER — SODIUM CHLORIDE 9 MG/ML
10 INJECTION INTRAMUSCULAR; INTRAVENOUS; SUBCUTANEOUS
Refills: 0 | Status: DISCONTINUED | OUTPATIENT
Start: 2022-12-25 | End: 2022-12-29

## 2022-12-25 RX ORDER — POTASSIUM CHLORIDE 20 MEQ
10 PACKET (EA) ORAL ONCE
Refills: 0 | Status: COMPLETED | OUTPATIENT
Start: 2022-12-25 | End: 2022-12-25

## 2022-12-25 RX ORDER — METOPROLOL TARTRATE 50 MG
50 TABLET ORAL ONCE
Refills: 0 | Status: COMPLETED | OUTPATIENT
Start: 2022-12-25 | End: 2022-12-25

## 2022-12-25 RX ADMIN — Medication 7.5 MG/MIN: at 11:36

## 2022-12-25 RX ADMIN — Medication 40 MILLIEQUIVALENT(S): at 00:19

## 2022-12-25 RX ADMIN — HEPARIN SODIUM 5000 UNIT(S): 5000 INJECTION INTRAVENOUS; SUBCUTANEOUS at 21:09

## 2022-12-25 RX ADMIN — AMIODARONE HYDROCHLORIDE 16.7 MG/MIN: 400 TABLET ORAL at 19:45

## 2022-12-25 RX ADMIN — Medication 81 MILLIGRAM(S): at 10:53

## 2022-12-25 RX ADMIN — Medication 25 GRAM(S): at 00:56

## 2022-12-25 RX ADMIN — Medication 50 MILLIGRAM(S): at 10:47

## 2022-12-25 RX ADMIN — Medication 1 MILLIGRAM(S): at 21:09

## 2022-12-25 RX ADMIN — AMIODARONE HYDROCHLORIDE 400 MILLIGRAM(S): 400 TABLET ORAL at 12:04

## 2022-12-25 RX ADMIN — Medication 100 MILLIEQUIVALENT(S): at 00:19

## 2022-12-25 RX ADMIN — CHLORHEXIDINE GLUCONATE 1 APPLICATION(S): 213 SOLUTION TOPICAL at 06:30

## 2022-12-25 RX ADMIN — FENTANYL CITRATE 25 MICROGRAM(S): 50 INJECTION INTRAVENOUS at 01:20

## 2022-12-25 RX ADMIN — Medication 7.5 MG/MIN: at 19:45

## 2022-12-25 RX ADMIN — ATORVASTATIN CALCIUM 10 MILLIGRAM(S): 80 TABLET, FILM COATED ORAL at 21:09

## 2022-12-25 RX ADMIN — FENTANYL CITRATE 25 MICROGRAM(S): 50 INJECTION INTRAVENOUS at 01:10

## 2022-12-25 RX ADMIN — Medication 50 MILLIGRAM(S): at 01:55

## 2022-12-25 RX ADMIN — Medication 50 MILLIGRAM(S): at 17:40

## 2022-12-25 RX ADMIN — SODIUM CHLORIDE 250 MILLILITER(S): 9 INJECTION, SOLUTION INTRAVENOUS at 01:52

## 2022-12-25 NOTE — CONSULT NOTE ADULT - ATTENDING COMMENTS
seen and agree  Hx of DCM presents with VT storm and multiple ICD shocks  he has now settled on IV amiodarone  OK to wean lido and switch to PO amiodarone  Patient followed at [facility], discussed with team there  Patient amenable for repeat ablation either here or at [facility], Patient to decide

## 2022-12-25 NOTE — PROGRESS NOTE ADULT - SUBJECTIVE AND OBJECTIVE BOX
Kody Trevino NP  CCU Service  Cardiology   Spect: [id number] ([facility])     PATIENT: VINNY GRACE, MRN: [id number]    CHIEF COMPLAINT: Patient is a 37y old  Male who presents with a chief complaint of ICD firing (24 Dec 2022 23:09)      INTERVAL HISTORY/OVERNIGHT EVENTS: No VT on tele. Denies abdominal pain, chest pain or SOB, cough. Has been ambulating with assistance. Oriented to person, place, and time. Breathing comfortably on room air.    REVIEW OF SYSTEMS:    Constitutional:     [ ] negative [ ] fevers [ ] chills [ ] weight loss [ ] weight gain  HEENT:                  [ ] negative [ ] dry eyes [ ] eye irritation [ ] postnasal drip [ ] nasal congestion  CV:                         [ ] negative  [ ] chest pain [ ] orthopnea [ ] palpitations [ ] murmur  Resp:                     [ ] negative [ ] cough [ ] shortness of breath [ ] dyspnea [ ] wheezing [ ] sputum [ ] hemoptysis  GI:                          [ ] negative [ ] nausea [ ] vomiting [ ] diarrhea [ ] constipation [ ] abd pain [ ] dysphagia   :                        [ ] negative [ ] dysuria [ ] nocturia [ ] hematuria [ ] increased urinary frequency  Musculoskeletal: [ ] negative [ ] back pain [ ] myalgias [ ] arthralgias [ ] fracture  Skin:                       [ ] negative [ ] rash [ ] itch  Neurological:        [ ] negative [ ] headache [ ] dizziness [ ] syncope [ ] weakness [ ] numbness  Psychiatric:           [ ] negative [ ] anxiety [ ] depression  Endocrine:            [ ] negative [ ] diabetes [ ] thyroid problem  Heme/Lymph:      [ ] negative [ ] anemia [ ] bleeding problem  Allergic/Immune: [ ] negative [ ] itchy eyes [ ] nasal discharge [ ] hives [ ] angioedema    [x] All other systems negative  [ ] Unable to assess ROS because ________.    MEDICATIONS:  MEDICATIONS  (STANDING):  aMIOdarone Infusion 0.5 mG/Min (16.7 mL/Hr) IV Continuous <Continuous>  aspirin enteric coated 81 milliGRAM(s) Oral daily  atorvastatin 10 milliGRAM(s) Oral at bedtime  lactated ringers. 500 milliLiter(s) (250 mL/Hr) IV Continuous <Continuous>  lidocaine   Infusion 2 mG/Min (15 mL/Hr) IV Continuous <Continuous>  metoprolol tartrate 50 milliGRAM(s) Oral every 8 hours    MEDICATIONS  (PRN):  sodium chloride 0.9% lock flush 10 milliLiter(s) IV Push every 1 hour PRN Pre/post blood products, medications, blood draw, and to maintain line patency      ALLERGIES: Allergies    No Known Allergies    Intolerances        OBJECTIVE:  ICU Vital Signs Last 24 Hrs  T(C): 36.9 (25 Dec 2022 07:58), Max: 37.9 (25 Dec 2022 00:40)  T(F): 98.4 (25 Dec 2022 07:58), Max: 100.2 (25 Dec 2022 00:40)  HR: 64 (25 Dec 2022 07:00) (61 - 107)  BP: 99/73 (25 Dec 2022 07:00) (90/63 - 135/89)  BP(mean): 81 (25 Dec 2022 07:00) (71 - 94)  RR: 26 (25 Dec 2022 07:00) (16 - 26)  SpO2: 96% (25 Dec 2022 07:00) (96% - 100%)    O2 Parameters below as of 25 Dec 2022 00:40  Patient On (Oxygen Delivery Method): room air    CAPILLARY BLOOD GLUCOSE        I&O's Summary    24 Dec 2022 07:01  -  25 Dec 2022 07:00  --------------------------------------------------------  IN: 868.8 mL / OUT: 900 mL / NET: -31.2 mL     Daily     PHYSICAL EXAMINATION:  General: Comfortable, no acute distress, cooperative with exam.  HEENT: Moist mucous membranes.  Respiratory: CTAB, normal respiratory effort, no coughing, wheezes, crackles, or rales.  CV: RRR, S1S2, no murmurs, rubs or gallops. No JVD. Distal pulses intact.  Abdominal: Soft, nontender, nondistended, no rebound or guarding, normal bowel sounds.  Neurology: AOx3, no focal neuro defects, CHRISTIAN x 4.  Extremities: No pitting edema, + Peripheral pulses. Warm   Incisions:   Tubes:    LABS:                          13.7   10.22 )-----------( 238      ( 25 Dec 2022 01:54 )             41.7     12-    137  |  104  |  13  ----------------------------<  135<H>  5.0   |  24  |  1.10    Ca    9.0      25 Dec 2022 01:54  Phos  1.7       Mg     2.20         TPro  8.1  /  Alb  4.4  /  TBili  0.6  /  DBili  x   /  AST  28  /  ALT  35  /  AlkPhos  92      LIVER FUNCTIONS - ( 24 Dec 2022 22:00 )  Alb: 4.4 g/dL / Pro: 8.1 g/dL / ALK PHOS: 92 U/L / ALT: 35 U/L / AST: 28 U/L / GGT: x           PT/INR - ( 25 Dec 2022 01:54 )   PT: 15.4 sec;   INR: 1.32 ratio         PTT - ( 25 Dec 2022 01:54 )  PTT:25.9 sec        Urinalysis Basic - ( 25 Dec 2022 01:54 )    Color: Colorless / Appearance: Clear / S.006 / pH: x  Gluc: x / Ketone: Negative  / Bili: Negative / Urobili: <2 mg/dL   Blood: x / Protein: Negative / Nitrite: Negative   Leuk Esterase: Negative / RBC: x / WBC x   Sq Epi: x / Non Sq Epi: x / Bacteria: x        TELEMETRY: NSR    EKG:     IMAGING:

## 2022-12-25 NOTE — PROGRESS NOTE ADULT - CRITICAL CARE ATTENDING COMMENT
37-year-old man with history of ventricular tachycardia (s/p ablation and AICD SJ 2015 likely from ventricular scarring) in the setting of [facility]. He presented with VT Storm, multiple episodes of AICD firing since 21:30 on 12/24/22.  He received lidocaine bolus followed by lidocaine gtt 2mcg/min in the ED.  Preliminary interrogation of ICD showed 60 VT/VF events with 58 ATP and 20 ICD shocks. Baseline ECG showed sinus tachycardia 105 with frequent PVCs After 2 additional ICD shocks in ED, he received additional lidocaine bolus with metoprolol 5mg IVP, amiodarone 300 mg IV bolus followed by Amiodarone gtt.  He received Fentanyl 50 mCg and magnesium 2mg. Initial labs noted mild leukocytosis with WBC 12.5K/uL, SCr 1.22 mg/dL., Na 134mmol/L, K+ 3.1 mg/dL, Lactate 3.1. (24 Dec 2022 23:09). Current findings are c/w VT storm with PVCs localized to left superior, responding to lidocaine and amiodarone. Findings were reviewed on rounds with Dr. Marisa Lewis, EP Cardiology.

## 2022-12-25 NOTE — CONSULT NOTE ADULT - ASSESSMENT
37M PMHx: NICM, (s/p cath [facility] 2015, "normal cath"), Ventricular tachycardia (s/p ablation and AICD [facility] 2015 likely from ventricular scarring) presented from home for multiple episodes of AICD firing while watching TV. Acutely pt was managed w Lidocaine gtt and Amiodarone.  Preliminary interrogation of ICD shows 60 VT/VF with 58 ATP and 20 ICD shocks. Rhythm VTach 200s with unsuccessful ATPs followed by shock.  ECG  with frequent PVCs.    Patient reports he had a Moderna Covid Booster on 12/23/22.     Dr. Arteaga at [place] and last saw him early this month.       VT storm       Gonzalo López MD  Cardiology Fellow      Please check [email or web address] password: [email or web address] for cardiology service schedule and contact information.  37M PMHx: NICM, (s/p cath [facility] 2015, "normal cath"), Ventricular tachycardia (s/p ablation and AICD [facility] 2015 likely from ventricular scarring) presented from home for multiple episodes of AICD firing while watching TV. Acutely pt was managed w Lidocaine gtt and Amiodarone.  Preliminary interrogation of ICD shows 60 VT/VF with 58 ATP and 20 ICD shocks. Rhythm VTach 200s with unsuccessful ATPs followed by shock.  ECG  with frequent PVCs.    Patient reports he had a Moderna Covid Booster on 12/23/22. Family hx of sudden cardiac death in mother (40's)     Dr. Arteaga at [place] and last saw him early this month.       VT storm   PVCs localized to Left superior   Quiescent now on Lido and Amio  Would decrease Lidocaine gtt to 1 and transition to PO amiodarone load with 400 mg TID for load of 10 g   Will touch base with [facility] regarding previous ablation and pt hx   Pt to likely need repeat ablation either here or [facility]   Await ECHO   Outpatient genetic testing given family hx   Cont telemetry monitoring   Keep Mg> 2 K > 4      Gonzalo López MD  Cardiology Fellow      Please check [email or web address]: [email or web address] for cardiology service schedule and contact information.

## 2022-12-25 NOTE — PATIENT PROFILE ADULT - FALL HARM RISK - UNIVERSAL INTERVENTIONS
Bed in lowest position, wheels locked, appropriate side rails in place/Call bell, personal items and telephone in reach/Instruct patient to call for assistance before getting out of bed or chair/Non-slip footwear when patient is out of bed/[place] to call system/Physically safe environment - no spills, clutter or unnecessary equipment/Purposeful Proactive Rounding/Room/bathroom lighting operational, light cord in reach

## 2022-12-25 NOTE — CONSULT NOTE ADULT - SUBJECTIVE AND OBJECTIVE BOX
Patient seen and evaluated at bedside    Chief Complaint:    HPI:  This is a 37 year old with past medical history of Ventricular tachycardia (s/p ablation and AICD [facility] 2015 likely from ventricular scarring) presented from home for multiple episodes of AICD firing that began at 9:30pm while watching TV. In ED patient initially received Lidocaine bolus and Lidocaine gtt 2mcg/min.  Preliminary interrogation of ICD shows 60 VT/VF with 58 ATP and 20 ICD shocks. Rhythm VTach 200s with unsuccessful ATPs followed by shock.  ECG  with frequent PVCs.  Patient had 2 subsequent shocks in ED.  Additional lidocaine bolus given with Metoprolol 5mg IVP, Amiodarone 300mg and started on Amiodarone gtt.  Patient received Fentanyl 50mcg and Magnesium 2mg.  Patient reports he had a Moderna Covid Booster on 12/23/22 and woke up with maltejase, sleeping most of day.  He denies fever, chills, cough, recent sick contact, syncope, SOB, BAXTER, N/V/D.  Patient reports he is under the care of Dr. Arteaga at [place] and last saw him early this month.  He report he had 1 previous firing 6-7 years ago with no subsequent firing until now.     On assessment, patient appears anxious, A+OX3, , /71, temp 99.0F, euvolemic, lungs sounds clear in all fields, rr 16, No JVD, No pedal edema.  Labs show mild leukocytosis with WBC 12.5, no anemia, creatinine 1.22 Na 134, K+ 3.1 Lactate 3.1. (24 Dec 2022 23:09)      PMHx:   Ventricular tachycardia        PSHx:   Pacemaker  AICD (automatic cardioverter/defibrillator) present        Allergies:  No Known Allergies      Home Meds:    Current Medications:   aMIOdarone Infusion 0.5 mG/Min IV Continuous <Continuous>  aMIOdarone IVPB 150 milliGRAM(s) IV Intermittent once  aspirin enteric coated 81 milliGRAM(s) Oral daily  atorvastatin 10 milliGRAM(s) Oral at bedtime  chlorhexidine 2% Cloths 1 Application(s) Topical <User Schedule>  lidocaine   Infusion 2 mG/Min IV Continuous <Continuous>  metoprolol tartrate 50 milliGRAM(s) Oral every 8 hours  sodium chloride 0.9% lock flush 10 milliLiter(s) IV Push every 1 hour PRN      FAMILY HISTORY:  Family history of stroke (Mother)  Family history of acute myocardial infarction (Mother)        Social History:  Denies drug use/ ETOH/ Smoking     Review of Systems:  12 point review of systems completed   All other review of systems is negative unless indicated above.    Physical Exam:  T(F): 98.4 (12-25), Max: 100.2 (12-25)  HR: 64 (12-25) (61 - 107)  BP: 99/73 (12-25) (90/63 - 135/89)  RR: 26 (12-25)  SpO2: 96% (12-25)    GENERAL: No acute distress, well-developed  HEAD:  Atraumatic, Normocephalic  ENT: EOMI, PERRLA, conjunctiva and sclera clear, Neck supple, No JVD, moist mucosa  CHEST/LUNG: Clear to auscultation bilaterally; No wheeze, equal breath sounds bilaterally   BACK: No spinal tenderness  HEART: Regular rate and rhythm; No murmurs, rubs, or gallops  ABDOMEN: Soft, Nontender, Nondistended; Bowel sounds present  EXTREMITIES:  No clubbing, cyanosis, or edema  PSYCH: Nl behavior, nl affect  NEUROLOGY: AAOx3, non-focal, cranial nerves intact  SKIN: Normal color, No rashes or lesions  LINES:                          13.7   10.22 )-----------( 238      ( 25 Dec 2022 01:54 )             41.7     12-25    137  |  104  |  13  ----------------------------<  135<H>  5.0   |  24  |  1.10    Ca    9.0      25 Dec 2022 01:54  Phos  1.7     12-25  Mg     2.20     12-25    TPro  8.1  /  Alb  4.4  /  TBili  0.6  /  DBili  x   /  AST  28  /  ALT  35  /  AlkPhos  92  12-24    PT/INR - ( 25 Dec 2022 01:54 )   PT: 15.4 sec;   INR: 1.32 ratio         PTT - ( 25 Dec 2022 01:54 )  PTT:25.9 sec      Serum Pro-Brain Natriuretic Peptide: 290 pg/mL (12-24 @ 22:00)        Thyroid Stimulating Hormone, Serum: 1.66 uIU/mL (12-25 @ 01:54)

## 2022-12-25 NOTE — PROGRESS NOTE ADULT - ASSESSMENT
37M PMHx: NICM, (s/p cath [facility] 2015, "normal cath"), Ventricular tachycardia (s/p ablation and AICD [facility] 2015 likely from ventricular scarring) presented from home for multiple episodes of AICD firing that began at 9:30pm while watching TV. In ED patient initially received Lidocaine bolus and Lidocaine gtt 2mg/hr.  Preliminary interrogation of ICD shows 60 VT/VF with 58 ATP and 20 ICD shocks. Rhythm VTach 200s with unsuccessful ATPs followed by shock.  ECG  with frequent PVCs.  Patient had 2 subsequent shocks in ED.  Additional lidocaine bolus given with Metoprolol 5mg IVP, Amiodarone 300mg and started on Amiodarone gtt.  Patient received Fentanyl 50mcg and Magnesium 2mg.  Patient reports he had a Moderna Covid Booster on 12/23/22 and woke up with malaise sleeping most of day.  He denies fever, chills, cough, recent sick contact, syncope, SOB, BAXTER, N/V/D.  Patient reports he is under the care of Dr. Arteaga at [place] and last saw him early this month.  He report he had 1 previous firing 6-7 years ago with no subsequent firing until now.     Neuro:  - A&Ox3   - No issues     Resp:  - Satting well on RA    Cardiac:  Ventricular tachycardia  - Moderna Covid Booster on 12/23/22  - Lidocaine gtt 1mg   - Amio gtt 0.5mg   - Check ECHO   - Continue lopressor 50mg TID   - Central line placed for IV Amio  - Monitor electrolytes q12h and replete to maintain K >4 and Mag >2  - EP f/u.  GI:   - Continue diet     :  - Nl Scr     ID  - Normal WBC this morning  - RVP neg      37M PMHx: NICM, (s/p cath [facility] 2015, "normal cath"), Ventricular tachycardia (s/p ablation and AICD [facility] 2015 likely from ventricular scarring) presented from home for multiple episodes of AICD firing that began at 9:30pm while watching TV. In ED patient initially received Lidocaine bolus and Lidocaine gtt 2mg/hr.  Preliminary interrogation of ICD shows 60 VT/VF with 58 ATP and 20 ICD shocks. Rhythm VTach 200s with unsuccessful ATPs followed by shock.  ECG  with frequent PVCs.  Patient had 2 subsequent shocks in ED.  Additional lidocaine bolus given with Metoprolol 5mg IVP, Amiodarone 300mg and started on Amiodarone gtt.  Patient received Fentanyl 50mcg and Magnesium 2mg.  Patient reports he had a Moderna Covid Booster on 12/23/22 and woke up with malaise sleeping most of day.  He denies fever, chills, cough, recent sick contact, syncope, SOB, BAXTER, N/V/D.  Patient reports he is under the care of Dr. Arteaga at [place] and last saw him early this month.  He report he had 1 previous firing 6-7 years ago with no subsequent firing until now.     Neuro:  - A&Ox3   - No issues     Resp:  - Satting well on RA    Cardiac:  Ventricular tachycardia  - Moderna Covid Booster on 12/23/22  - Lidocaine gtt 1mg   - Amio gtt 0.5mg   - Check ECHO   - Continue lopressor 50mg TID   - Central line placed for IV Amio  - Maintain K >4 and Mag >2  - EP f/u.  GI:   - Continue diet     :  - Nl Scr     ID  - Normal WBC this morning  - RVP neg

## 2022-12-26 LAB
ANION GAP SERPL CALC-SCNC: 9 MMOL/L — SIGNIFICANT CHANGE UP (ref 7–14)
BLD GP AB SCN SERPL QL: NEGATIVE — SIGNIFICANT CHANGE UP
BLOOD GAS VENOUS COMPREHENSIVE RESULT: SIGNIFICANT CHANGE UP
BUN SERPL-MCNC: 14 MG/DL — SIGNIFICANT CHANGE UP (ref 7–23)
CALCIUM SERPL-MCNC: 9.1 MG/DL — SIGNIFICANT CHANGE UP (ref 8.4–10.5)
CHLORIDE SERPL-SCNC: 106 MMOL/L — SIGNIFICANT CHANGE UP (ref 98–107)
CO2 SERPL-SCNC: 25 MMOL/L — SIGNIFICANT CHANGE UP (ref 22–31)
CREAT SERPL-MCNC: 1.12 MG/DL — SIGNIFICANT CHANGE UP (ref 0.5–1.3)
EGFR: 87 ML/MIN/1.73M2 — SIGNIFICANT CHANGE UP
GLUCOSE SERPL-MCNC: 100 MG/DL — HIGH (ref 70–99)
HCT VFR BLD CALC: 42.2 % — SIGNIFICANT CHANGE UP (ref 39–50)
HGB BLD-MCNC: 13.6 G/DL — SIGNIFICANT CHANGE UP (ref 13–17)
MAGNESIUM SERPL-MCNC: 2.1 MG/DL — SIGNIFICANT CHANGE UP (ref 1.6–2.6)
MCHC RBC-ENTMCNC: 26.6 PG — LOW (ref 27–34)
MCHC RBC-ENTMCNC: 32.2 GM/DL — SIGNIFICANT CHANGE UP (ref 32–36)
MCV RBC AUTO: 82.4 FL — SIGNIFICANT CHANGE UP (ref 80–100)
NRBC # BLD: 0 /100 WBCS — SIGNIFICANT CHANGE UP (ref 0–0)
NRBC # FLD: 0 K/UL — SIGNIFICANT CHANGE UP (ref 0–0)
PHOSPHATE SERPL-MCNC: 3.7 MG/DL — SIGNIFICANT CHANGE UP (ref 2.5–4.5)
PLATELET # BLD AUTO: 215 K/UL — SIGNIFICANT CHANGE UP (ref 150–400)
POTASSIUM SERPL-MCNC: 4.3 MMOL/L — SIGNIFICANT CHANGE UP (ref 3.5–5.3)
POTASSIUM SERPL-SCNC: 4.3 MMOL/L — SIGNIFICANT CHANGE UP (ref 3.5–5.3)
RBC # BLD: 5.12 M/UL — SIGNIFICANT CHANGE UP (ref 4.2–5.8)
RBC # FLD: 12.4 % — SIGNIFICANT CHANGE UP (ref 10.3–14.5)
RH IG SCN BLD-IMP: POSITIVE — SIGNIFICANT CHANGE UP
SODIUM SERPL-SCNC: 140 MMOL/L — SIGNIFICANT CHANGE UP (ref 135–145)
WBC # BLD: 7.98 K/UL — SIGNIFICANT CHANGE UP (ref 3.8–10.5)
WBC # FLD AUTO: 7.98 K/UL — SIGNIFICANT CHANGE UP (ref 3.8–10.5)

## 2022-12-26 PROCEDURE — 99233 SBSQ HOSP IP/OBS HIGH 50: CPT | Mod: GC

## 2022-12-26 RX ORDER — ALPRAZOLAM 0.25 MG
0.5 TABLET ORAL ONCE
Refills: 0 | Status: DISCONTINUED | OUTPATIENT
Start: 2022-12-26 | End: 2022-12-26

## 2022-12-26 RX ORDER — METOPROLOL TARTRATE 50 MG
50 TABLET ORAL EVERY 8 HOURS
Refills: 0 | Status: DISCONTINUED | OUTPATIENT
Start: 2022-12-26 | End: 2022-12-26

## 2022-12-26 RX ORDER — AMIODARONE HYDROCHLORIDE 400 MG/1
400 TABLET ORAL EVERY 8 HOURS
Refills: 0 | Status: DISCONTINUED | OUTPATIENT
Start: 2022-12-26 | End: 2022-12-26

## 2022-12-26 RX ORDER — LIDOCAINE HCL 20 MG/ML
0.5 VIAL (ML) INJECTION
Qty: 2 | Refills: 0 | Status: DISCONTINUED | OUTPATIENT
Start: 2022-12-26 | End: 2022-12-26

## 2022-12-26 RX ORDER — LIDOCAINE HCL 20 MG/ML
1 VIAL (ML) INJECTION
Qty: 2 | Refills: 0 | Status: DISCONTINUED | OUTPATIENT
Start: 2022-12-26 | End: 2022-12-27

## 2022-12-26 RX ORDER — METOPROLOL TARTRATE 50 MG
50 TABLET ORAL EVERY 8 HOURS
Refills: 0 | Status: DISCONTINUED | OUTPATIENT
Start: 2022-12-26 | End: 2022-12-28

## 2022-12-26 RX ORDER — ACETAMINOPHEN 500 MG
650 TABLET ORAL EVERY 6 HOURS
Refills: 0 | Status: DISCONTINUED | OUTPATIENT
Start: 2022-12-26 | End: 2022-12-29

## 2022-12-26 RX ORDER — LANOLIN ALCOHOL/MO/W.PET/CERES
3 CREAM (GRAM) TOPICAL AT BEDTIME
Refills: 0 | Status: DISCONTINUED | OUTPATIENT
Start: 2022-12-26 | End: 2022-12-29

## 2022-12-26 RX ORDER — AMIODARONE HYDROCHLORIDE 400 MG/1
TABLET ORAL
Refills: 0 | Status: DISCONTINUED | OUTPATIENT
Start: 2022-12-26 | End: 2022-12-26

## 2022-12-26 RX ADMIN — HEPARIN SODIUM 5000 UNIT(S): 5000 INJECTION INTRAVENOUS; SUBCUTANEOUS at 14:00

## 2022-12-26 RX ADMIN — Medication 50 MILLIGRAM(S): at 13:05

## 2022-12-26 RX ADMIN — Medication 7.5 MG/MIN: at 17:00

## 2022-12-26 RX ADMIN — Medication 3.75 MG/MIN: at 12:00

## 2022-12-26 RX ADMIN — HEPARIN SODIUM 5000 UNIT(S): 5000 INJECTION INTRAVENOUS; SUBCUTANEOUS at 22:16

## 2022-12-26 RX ADMIN — AMIODARONE HYDROCHLORIDE 400 MILLIGRAM(S): 400 TABLET ORAL at 13:04

## 2022-12-26 RX ADMIN — ATORVASTATIN CALCIUM 10 MILLIGRAM(S): 80 TABLET, FILM COATED ORAL at 22:15

## 2022-12-26 RX ADMIN — CHLORHEXIDINE GLUCONATE 1 APPLICATION(S): 213 SOLUTION TOPICAL at 06:12

## 2022-12-26 RX ADMIN — Medication 0.5 MILLIGRAM(S): at 20:14

## 2022-12-26 RX ADMIN — Medication 81 MILLIGRAM(S): at 12:13

## 2022-12-26 RX ADMIN — HEPARIN SODIUM 5000 UNIT(S): 5000 INJECTION INTRAVENOUS; SUBCUTANEOUS at 06:12

## 2022-12-26 RX ADMIN — Medication 3 MILLIGRAM(S): at 22:16

## 2022-12-26 RX ADMIN — Medication 50 MILLIGRAM(S): at 22:17

## 2022-12-26 RX ADMIN — Medication 7.5 MG/MIN: at 20:15

## 2022-12-26 NOTE — PROGRESS NOTE ADULT - ASSESSMENT
37M PMHx: NICM, (s/p cath [facility] 2015, "normal cath"), Ventricular tachycardia (s/p ablation and AICD [facility] 2015 likely from ventricular scarring) presented from home for multiple episodes of AICD firing that began at 9:30pm while watching TV. In ED patient initially received Lidocaine bolus and Lidocaine gtt 2mg/hr.  Preliminary interrogation of ICD shows 60 VT/VF with 58 ATP and 20 ICD shocks. Rhythm VTach 200s with unsuccessful ATPs followed by shock.  ECG  with frequent PVCs.  Patient had 2 subsequent shocks in ED.  Additional lidocaine bolus given with Metoprolol 5mg IVP, Amiodarone 300mg and started on Amiodarone gtt.  Patient received Fentanyl 50mcg and Magnesium 2mg.  Patient reports he had a Moderna Covid Booster on 12/23/22 and woke up with malaise sleeping most of day.  He denies fever, chills, cough, recent sick contact, syncope, SOB, BAXTER, N/V/D.  Patient reports he is under the care of Dr. Arteaga at [place] and last saw him early this month.  He report he had 1 previous firing 6-7 years ago with no subsequent firing until now.     Neuro:  - A&Ox3   - No issues     Resp:  - Satting well on RA    Cardiac:  #Ventricular tachycardia  - Moderna Covid Booster on 12/23/22  - limited TTE with mild to moderate LV dysfunction w/ hypokinesis of inf and inf.lateral walls  - Lidocaine gtt 1mg, wean?  - Amio gtt 0.5mg, transition to PO?  - Continue lopressor 50mg TID   - Central line placed for IV Amio  - Maintain K >4 and Mag >2  - EP f/u.    GI:   - NPO afternight for ablation      /RENAL:  - Nl Scr     ID  - Normal WBC this morning  - RVP neg     ENDO  - no active issues     DVT Ppx: SubQ hep    37M PMHx: NICM, (s/p cath [facility] 2015, "normal cath"), Ventricular tachycardia (s/p ablation and AICD [facility] 2015 likely from ventricular scarring) presented from home for multiple episodes of AICD firing that began at 9:30pm while watching TV. In ED patient initially received Lidocaine bolus and Lidocaine gtt 2mg/hr.  Preliminary interrogation of ICD shows 60 VT/VF with 58 ATP and 20 ICD shocks. Rhythm VTach 200s with unsuccessful ATPs followed by shock.  ECG  with frequent PVCs.  Patient had 2 subsequent shocks in ED.  Additional lidocaine bolus given with Metoprolol 5mg IVP, Amiodarone 300mg and started on Amiodarone gtt.  Patient received Fentanyl 50mcg and Magnesium 2mg.  Patient reports he had a Moderna Covid Booster on 12/23/22 and woke up with malaise sleeping most of day.  He denies fever, chills, cough, recent sick contact, syncope, SOB, BAXTER, N/V/D.  Patient reports he is under the care of Dr. Arteaga at [place] and last saw him early this month.  He report he had 1 previous firing 6-7 years ago with no subsequent firing until now.     Neuro:  - A&Ox3   - No issues     Resp:  - Satting well on RA    Cardiac:  #Ventricular tachycardia  - Moderna Covid Booster on 12/23/22  - limited TTE with mild to moderate LV dysfunction w/ hypokinesis of inf and inf.lateral walls  - Dec Lidocaine gtt to 0.5mg  - d/c Amio gtt 0.5mg, now on amio 400 TID load for 10G   - Continue lopressor 50mg TID   - Central line placed for IV Amio  - Maintain K >4 and Mag >2  - EP following, plan for ablation with Dr. Bullock tomorrow     GI:   - NPO after midnight for ablation      /RENAL:  - Nl Scr     ID  - Normal WBC this morning  - RVP neg     ENDO  - no active issues     DVT Ppx: SubQ hep

## 2022-12-26 NOTE — PROGRESS NOTE ADULT - SUBJECTIVE AND OBJECTIVE BOX
Interval History: No shocks overnight     Medications:  aMIOdarone Infusion 0.501 mG/Min IV Continuous <Continuous>  aspirin enteric coated 81 milliGRAM(s) Oral daily  atorvastatin 10 milliGRAM(s) Oral at bedtime  chlorhexidine 2% Cloths 1 Application(s) Topical <User Schedule>  heparin   Injectable 5000 Unit(s) SubCutaneous every 8 hours  lidocaine   Infusion 1 mG/Min IV Continuous <Continuous>  metoprolol tartrate 50 milliGRAM(s) Oral every 8 hours  sodium chloride 0.9% lock flush 10 milliLiter(s) IV Push every 1 hour PRN      Vitals:  T(C): 36.6 (22 @ 08:00), Max: 36.8 (22 @ 04:00)  HR: 62 (22 @ 08:00) (58 - 79)  BP: 104/81 (22 @ 08:00) (94/61 - 119/85)  BP(mean): 89 (22 @ 08:00) (72 - 97)  RR: 18 (22 @ 08:00) (13 - 23)  SpO2: 95% (22 @ 21:00) (95% - 99%)    Daily     Daily Weight in k.5 (26 Dec 2022 06:00)    Weight (kg): 80 ( @ 22:18)    I&O's Summary    25 Dec 2022 07:01  -  26 Dec 2022 07:00  --------------------------------------------------------  IN: 985.5 mL / OUT: 2400 mL / NET: -1414.5 mL        Physical Exam:  Appearance: No Acute Distress  HEENT: No JVD  Cardiovascular: Normal S1 S2, No murmurs/rubs/gallops  Respiratory: Clear to auscultation bilaterally  Gastrointestinal: Soft, Non-tender	  Skin: No cyanosis	  Neurologic: Non-focal  Extremities: No LE edema  Psychiatry: A & O x 3, Mood & affect appropriate    Labs:                        13.6   7.98  )-----------( 215      ( 26 Dec 2022 06:00 )             42.2         140  |  106  |  14  ----------------------------<  100<H>  4.3   |  25  |  1.12    Ca    9.1      26 Dec 2022 06:00  Phos  3.7       Mg     2.10         TPro  8.1  /  Alb  4.4  /  TBili  0.6  /  DBili  x   /  AST  28  /  ALT  35  /  AlkPhos  92      PT/INR - ( 25 Dec 2022 01:54 )   PT: 15.4 sec;   INR: 1.32 ratio         PTT - ( 25 Dec 2022 01:54 )  PTT:25.9 sec      Serum Pro-Brain Natriuretic Peptide: 290 pg/mL ( @ 22:00)              TELEMETRY: SR Occasional  PVC

## 2022-12-26 NOTE — PROGRESS NOTE ADULT - NS ATTEST RISK PROBLEM GEN_ALL_CORE FT
37-year-old man with history of ventricular tachycardia (s/p ablation and AICD [facility] 2015 likely from ventricular scarring) in the setting of NICM. He presented with multiple episodes of AICD firing since 21:30 on 12/24/22.  He received lidocaine bolus followed by lidocaine gtt 2mcg/min in the ED.  Preliminary interrogation of ICD showed 60 VT/VF events with 58 ATP and 20 ICD shocks. Baseline ECG showed sinus tachycardia 105 with frequent PVCs After 2 additional ICD shocks in ED, he received additional lidocaine bolus with metoprolol 5mg IVP, amiodarone 300 mg IV bolus followed by Amiodarone gtt.  He received Fentanyl 50 mCg and magnesium 2mg.  VT ablation now planned with Dr. LAURA Bullock for 12/27/22.

## 2022-12-26 NOTE — PROGRESS NOTE ADULT - ASSESSMENT
37M PMHx: NICM, (s/p cath [facility] 2015, "normal cath"), Ventricular tachycardia (s/p ablation and AICD [facility] 2015 likely from ventricular scarring) presented from home for multiple episodes of AICD firing while watching TV. Acutely pt was managed w Lidocaine gtt and Amiodarone.  Preliminary interrogation of ICD shows 60 VT/VF with 58 ATP and 20 ICD shocks. Rhythm VTach 200s with unsuccessful ATPs followed by shock.  ECG  with frequent PVCs.    Patient reports he had a Moderna Covid Booster on 12/23/22. Family hx of sudden cardiac death in mother (40's)     Dr. Arteaga at [place] and last saw him early this month.       VT storm   PVCs localized to Left superior   Quiescent now on Lido and Amio  Would wean Lidocaine off today and cont PO amiodarone load with 400 mg TID for load of 10 g   Cont Lopressor 50 mg TID   We spoke w [facility] regarding previous ablation and pt hx   Will plan for repeat ablation here w Dr. Bullock  ECHO LV not well visualized grossly at least mild to moderate left ventricular dysfunction with hypokinesis of the inferior and inferolateral walls  Outpatient genetic testing given family hx   Cont telemetry monitoring   Keep Mg> 2 K > 4      Gonzalo López MD  Cardiology Fellow      Please check [email or web address] password: [email or web address]" for cardiology service schedule and contact information.  37M PMHx: NICM, (s/p cath [facility] 2015, "normal cath"), Ventricular tachycardia (s/p ablation and AICD [facility] 2015 likely from ventricular scarring) presented from home for multiple episodes of AICD firing while watching TV. Acutely pt was managed w Lidocaine gtt and Amiodarone.  Preliminary interrogation of ICD shows 60 VT/VF with 58 ATP and 20 ICD shocks. Rhythm VTach 200s with unsuccessful ATPs followed by shock.  ECG  with frequent PVCs.    Patient reports he had a Moderna Covid Booster on 12/23/22. Family hx of sudden cardiac death in mother (40's)     Dr. Arteaga at [place] and last saw him early this month.       VT storm   PVCs localized to Left superior   Quiescent now on Lido and Amio  Would wean Lidocaine off today and cont PO amiodarone load with 400 mg TID for load of 10 g   Cont Lopressor 50 mg TID   We spoke w [facility] regarding previous ablation and pt hx   Will plan for repeat ablation here w Dr. Bullock  ECHO LV not well visualized grossly at least mild to moderate left ventricular dysfunction with hypokinesis of the inferior and inferolateral walls  Outpatient genetic testing given family hx   Cont telemetry monitoring   Keep Mg> 2 K > 4          Gonzalo López MD  Cardiology Fellow      Please check [email or web address]: [email or web address]" for cardiology service schedule and contact information.   \  Discussed with fellow  plan for ablation tomorrow  DC amiodarone (can re bolus if recurrent VT)  Discussed with Dr. Galileo LOUIE p MN, type and cross

## 2022-12-26 NOTE — CHART NOTE - NSCHARTNOTEFT_GEN_A_CORE
Contacted by EP oncall fellow: Dr. López with updated recommendations. As patient is agreeable to ablation here at [facility] we will discontinue po amiodarone load, continue po metoprolol, and maintain Lidocaine gtt at 1 mg/min.    NPO after MN for planned VT ablation in am. Pre procedure labs done. COVID neg 12/24/2022.

## 2022-12-26 NOTE — PROGRESS NOTE ADULT - SUBJECTIVE AND OBJECTIVE BOX
PROGRESS NOTE:   Authored by Maggie Prajapati DO , PGY1     Patient is a 37y old  Male who presents with a chief complaint of ICD firing (26 Dec 2022 08:41)      SUBJECTIVE / OVERNIGHT EVENTS:  Pt seen and examined at bedside. No acute events overnight. No shocks overnight. Denies chest pain, palpitations, SOB, abdominal pain, n/v/d   All other review of systems is negative unless indicated above.    MEDICATIONS  (STANDING):  aMIOdarone Infusion 0.501 mG/Min (16.7 mL/Hr) IV Continuous <Continuous>  aspirin enteric coated 81 milliGRAM(s) Oral daily  atorvastatin 10 milliGRAM(s) Oral at bedtime  chlorhexidine 2% Cloths 1 Application(s) Topical <User Schedule>  heparin   Injectable 5000 Unit(s) SubCutaneous every 8 hours  lidocaine   Infusion 1 mG/Min (7.5 mL/Hr) IV Continuous <Continuous>  metoprolol tartrate 50 milliGRAM(s) Oral every 8 hours    MEDICATIONS  (PRN):  sodium chloride 0.9% lock flush 10 milliLiter(s) IV Push every 1 hour PRN Pre/post blood products, medications, blood draw, and to maintain line patency      CAPILLARY BLOOD GLUCOSE        I&O's Summary    25 Dec 2022 07:01  -  26 Dec 2022 07:00  --------------------------------------------------------  IN: 985.5 mL / OUT: 2400 mL / NET: -1414.5 mL        PHYSICAL EXAM:  Vital Signs Last 24 Hrs  T(C): 36.6 (26 Dec 2022 08:00), Max: 36.8 (26 Dec 2022 04:00)  T(F): 97.8 (26 Dec 2022 08:00), Max: 98.3 (26 Dec 2022 04:00)  HR: 62 (26 Dec 2022 08:00) (58 - 79)  BP: 104/81 (26 Dec 2022 08:00) (94/61 - 119/85)  BP(mean): 89 (26 Dec 2022 08:00) (72 - 97)  RR: 18 (26 Dec 2022 08:00) (13 - 23)  SpO2: 95% (25 Dec 2022 21:00) (95% - 99%)        GENERAL: No acute distress, well-developed  HEAD:  Atraumatic, Normocephalic  EYES: EOMI, PERRLA, conjunctiva and sclera clear  NECK: Supple, no lymphadenopathy, no JVD  CHEST/LUNG: CTAB; No wheezes, rales, or rhonchi  HEART: Regular rate and rhythm; No murmurs, rubs, or gallops  ABDOMEN: Soft, non-tender, non-distended; normal bowel sounds, no organomegaly  EXTREMITIES:  2+ peripheral pulses b/l, No clubbing, cyanosis, or edema  NEUROLOGY: A&O x 3, no focal deficits  SKIN: No rashes or lesions    LABS:                        13.6   7.98  )-----------( 215      ( 26 Dec 2022 06:00 )             42.2     12-    140  |  106  |  14  ----------------------------<  100<H>  4.3   |  25  |  1.12    Ca    9.1      26 Dec 2022 06:00  Phos  3.7     12-  Mg     2.10     12-    TPro  8.1  /  Alb  4.4  /  TBili  0.6  /  DBili  x   /  AST  28  /  ALT  35  /  AlkPhos  92  12-24    PT/INR - ( 25 Dec 2022 01:54 )   PT: 15.4 sec;   INR: 1.32 ratio         PTT - ( 25 Dec 2022 01:54 )  PTT:25.9 sec      Urinalysis Basic - ( 25 Dec 2022 01:54 )    Color: Colorless / Appearance: Clear / S.006 / pH: x  Gluc: x / Ketone: Negative  / Bili: Negative / Urobili: <2 mg/dL   Blood: x / Protein: Negative / Nitrite: Negative   Leuk Esterase: Negative / RBC: x / WBC x   Sq Epi: x / Non Sq Epi: x / Bacteria: x        Culture - Blood (collected 25 Dec 2022 01:23)  Source: .Blood Blood  Preliminary Report (26 Dec 2022 07:01):    No growth to date.        RADIOLOGY & ADDITIONAL TESTS:  Results Reviewed:   Imaging Personally Reviewed:  Electrocardiogram Personally Reviewed:    COORDINATION OF CARE:  Care Discussed with Consultants/Other Providers [Y/N]:  Prior or Outpatient Records Reviewed [Y/N]:   PROGRESS NOTE:   Authored by Maggie Prajapati DO , PGY1     Patient is a 37y old  Male who presents with a chief complaint of ICD firing (26 Dec 2022 08:41)      SUBJECTIVE / OVERNIGHT EVENTS:  Pt seen and examined at bedside. No acute events overnight. No shocks overnight. Denies chest pain, palpitations, SOB, abdominal pain, n/v/d. Had a episode of sinus tachycardia to the 110 with palpitations that has now resolved   All other review of systems is negative unless indicated above.    MEDICATIONS  (STANDING):  aMIOdarone Infusion 0.501 mG/Min (16.7 mL/Hr) IV Continuous <Continuous>  aspirin enteric coated 81 milliGRAM(s) Oral daily  atorvastatin 10 milliGRAM(s) Oral at bedtime  chlorhexidine 2% Cloths 1 Application(s) Topical <User Schedule>  heparin   Injectable 5000 Unit(s) SubCutaneous every 8 hours  lidocaine   Infusion 1 mG/Min (7.5 mL/Hr) IV Continuous <Continuous>  metoprolol tartrate 50 milliGRAM(s) Oral every 8 hours    MEDICATIONS  (PRN):  sodium chloride 0.9% lock flush 10 milliLiter(s) IV Push every 1 hour PRN Pre/post blood products, medications, blood draw, and to maintain line patency      CAPILLARY BLOOD GLUCOSE        I&O's Summary    25 Dec 2022 07:01  -  26 Dec 2022 07:00  --------------------------------------------------------  IN: 985.5 mL / OUT: 2400 mL / NET: -1414.5 mL        PHYSICAL EXAM:  Vital Signs Last 24 Hrs  T(C): 36.6 (26 Dec 2022 08:00), Max: 36.8 (26 Dec 2022 04:00)  T(F): 97.8 (26 Dec 2022 08:00), Max: 98.3 (26 Dec 2022 04:00)  HR: 62 (26 Dec 2022 08:00) (58 - 79)  BP: 104/81 (26 Dec 2022 08:00) (94/61 - 119/85)  BP(mean): 89 (26 Dec 2022 08:00) (72 - 97)  RR: 18 (26 Dec 2022 08:00) (13 - 23)  SpO2: 95% (25 Dec 2022 21:00) (95% - 99%)        GENERAL: No acute distress, well-developed  HEAD:  Atraumatic, Normocephalic  EYES: EOMI, PERRLA, conjunctiva and sclera clear  NECK: Supple, no lymphadenopathy, no JVD  CHEST/LUNG: CTAB; No wheezes, rales, or rhonchi  HEART: Regular rate and rhythm; No murmurs, rubs, or gallops  ABDOMEN: Soft, non-tender, non-distended; normal bowel sounds, no organomegaly  EXTREMITIES:  2+ peripheral pulses b/l, No clubbing, cyanosis, or edema  NEUROLOGY: A&O x 3, no focal deficits  SKIN: No rashes or lesions    LABS:                        13.6   7.98  )-----------( 215      ( 26 Dec 2022 06:00 )             42.2     12-    140  |  106  |  14  ----------------------------<  100<H>  4.3   |  25  |  1.12    Ca    9.1      26 Dec 2022 06:00  Phos  3.7     12-  Mg     2.10     12-    TPro  8.1  /  Alb  4.4  /  TBili  0.6  /  DBili  x   /  AST  28  /  ALT  35  /  AlkPhos  92  12-24    PT/INR - ( 25 Dec 2022 01:54 )   PT: 15.4 sec;   INR: 1.32 ratio         PTT - ( 25 Dec 2022 01:54 )  PTT:25.9 sec      Urinalysis Basic - ( 25 Dec 2022 01:54 )    Color: Colorless / Appearance: Clear / S.006 / pH: x  Gluc: x / Ketone: Negative  / Bili: Negative / Urobili: <2 mg/dL   Blood: x / Protein: Negative / Nitrite: Negative   Leuk Esterase: Negative / RBC: x / WBC x   Sq Epi: x / Non Sq Epi: x / Bacteria: x        Culture - Blood (collected 25 Dec 2022 01:23)  Source: .Blood Blood  Preliminary Report (26 Dec 2022 07:01):    No growth to date.        RADIOLOGY & ADDITIONAL TESTS:  Results Reviewed:   Imaging Personally Reviewed:  Electrocardiogram Personally Reviewed:    COORDINATION OF CARE:  Care Discussed with Consultants/Other Providers [Y/N]:  Prior or Outpatient Records Reviewed [Y/N]:   PROGRESS NOTE:   Authored by Maggie Prajapati DO , PGY1     Patient is a 37y old  Male who presents with a chief complaint of ICD firing (26 Dec 2022 08:41)      SUBJECTIVE / OVERNIGHT EVENTS:  Pt seen and examined at bedside. No acute events overnight. No shocks overnight. Denies chest pain, palpitations, SOB, abdominal pain, n/v/d. Had a episode of sinus tachycardia to the 110 with palpitations that has now resolved   All other review of systems is negative unless indicated above.    MEDICATIONS  (STANDING):  aMIOdarone Infusion 0.501 mG/Min (16.7 mL/Hr) IV Continuous <Continuous>  aspirin enteric coated 81 milliGRAM(s) Oral daily  atorvastatin 10 milliGRAM(s) Oral at bedtime  chlorhexidine 2% Cloths 1 Application(s) Topical <User Schedule>  heparin   Injectable 5000 Unit(s) SubCutaneous every 8 hours  lidocaine   Infusion 1 mG/Min (7.5 mL/Hr) IV Continuous <Continuous>  metoprolol tartrate 50 milliGRAM(s) Oral every 8 hours    MEDICATIONS  (PRN):  sodium chloride 0.9% lock flush 10 milliLiter(s) IV Push every 1 hour PRN Pre/post blood products, medications, blood draw, and to maintain line patency      CAPILLARY BLOOD GLUCOSE        I&O's Summary    25 Dec 2022 07:01  -  26 Dec 2022 07:00  --------------------------------------------------------  IN: 985.5 mL / OUT: 2400 mL / NET: -1414.5 mL        PHYSICAL EXAM:  Vital Signs Last 24 Hrs  T(C): 36.6 (26 Dec 2022 08:00), Max: 36.8 (26 Dec 2022 04:00)  T(F): 97.8 (26 Dec 2022 08:00), Max: 98.3 (26 Dec 2022 04:00)  HR: 62 (26 Dec 2022 08:00) (58 - 79)  BP: 104/81 (26 Dec 2022 08:00) (94/61 - 119/85)  BP(mean): 89 (26 Dec 2022 08:00) (72 - 97)  RR: 18 (26 Dec 2022 08:00) (13 - 23)  SpO2: 95% (25 Dec 2022 21:00) (95% - 99%)        GENERAL: No acute distress, well-developed  HEAD:  Atraumatic, Normocephalic  EYES: EOMI, PERRLA, conjunctiva and sclera clear  NECK: Supple, no lymphadenopathy, no JVD  CHEST/LUNG: CTAB; No wheezes, rales, or rhonchi  HEART: Regular rate and rhythm; No murmurs, rubs, or gallops  ABDOMEN: Soft, non-tender, non-distended; normal bowel sounds, no organomegaly  EXTREMITIES:  2+ peripheral pulses b/l, No clubbing, cyanosis, or edema  NEUROLOGY: A&O x 3, no focal deficits  SKIN: No rashes or lesions    LABS:                        13.6   7.98  )-----------( 215      ( 26 Dec 2022 06:00 )             42.2     12-    140  |  106  |  14  ----------------------------<  100<H>  4.3   |  25  |  1.12    Ca    9.1      26 Dec 2022 06:00  Phos  3.7     12-  Mg     2.10     12-    TPro  8.1  /  Alb  4.4  /  TBili  0.6  /  DBili  x   /  AST  28  /  ALT  35  /  AlkPhos  92  12-24    PT/INR - ( 25 Dec 2022 01:54 )   PT: 15.4 sec;   INR: 1.32 ratio         PTT - ( 25 Dec 2022 01:54 )  PTT:25.9 sec      Urinalysis Basic - ( 25 Dec 2022 01:54 )    Color: Colorless / Appearance: Clear / S.006 / pH: x  Gluc: x / Ketone: Negative  / Bili: Negative / Urobili: <2 mg/dL   Blood: x / Protein: Negative / Nitrite: Negative   Leuk Esterase: Negative / RBC: x / WBC x   Sq Epi: x / Non Sq Epi: x / Bacteria: x        Culture - Blood (collected 25 Dec 2022 01:23)  Source: .Blood Blood  Preliminary Report (26 Dec 2022 07:01):    No growth to date.    Echocardiogram: < from: Transthoracic Echocardiogram (22 @ 12:09) >  DIMENSIONS:  Dimensions:     Normal Values:  LA:     3.7 cm    2.0 - 4.0 cm  Ao:     3.3 cm    2.0 - 3.8 cm  SEPTUM: 1.1 cm    0.6 - 1.2 cm  PWT:    1.1 cm    0.6 - 1.1 cm  LVIDd:  4.5 cm    3.0 - 5.6 cm  LVIDs:  2.7 cm    1.8 - 4.0 cm  Derived Variables:  LVMI: 91 g/m2  RWT: 0.48  Fractional short: 40 %  ------------------------------------------------------------------------  OBSERVATIONS:  Aortic Root: Normal aortic root.  Aortic Valve: Normal trileaflet aortic valve.  Left Atrium: Normal left atrium.  LA volume index = 30  cc/m2.  Left Ventricle: Endocardium not well visualized; grossly at  least mild tomoderate left ventricular dysfunction with  hypokinesis of the inferior and inferolateral walls  Pericardium/PleuraNormal pericardium with no pericardial  effusion.  Hemodynamic: Estimated right ventricular systolic pressure  equals 30 mm Hg, assuming right atrial pressure equals 10  mm Hg, consistent with normal pulmonary pressures.  ------------------------------------------------------------------------  CONCLUSIONS:  Very limited TTE  1. Endocardium not well visualized; grossly at least mild  to moderate left ventricular dysfunction with hypokinesis  of the inferior and inferolateral walls  ------------------------------------------------------------------------  Confirmed on  2022 - 18:40:33 by Genoveva Cast MD    < end of copied text >      RADIOLOGY & ADDITIONAL TESTS:  Results Reviewed:   Imaging Personally Reviewed:  Electrocardiogram Personally Reviewed:    COORDINATION OF CARE:  Care Discussed with Consultants/Other Providers [Y/N]:  Prior or Outpatient Records Reviewed [Y/N]:

## 2022-12-26 NOTE — PROGRESS NOTE ADULT - TIME BILLING
37-year-old man with history of ventricular tachycardia (s/p ablation and AICD [facility] 2015 likely from ventricular scarring) in the setting of [facility]ICM. He presented with multiple episodes of AICD firing since 21:30 on 12/24/22.  He received lidocaine bolus followed by lidocaine gtt 2mcg/min in the ED.  Preliminary interrogation of ICD showed 60 VT/VF events with 58 ATP and 20 ICD shocks. Baseline ECG showed sinus tachycardia 105 with frequent PVCs After 2 additional ICD shocks in ED, he received additional lidocaine bolus with metoprolol 5mg IVP, amiodarone 300 mg IV bolus followed by Amiodarone gtt.  He received Fentanyl 50 mCg and magnesium 2mg.  VT ablation now planned with Dr. LAURA Bullock for 12/27/22. 37-year-old man with history of ventricular tachycardia (s/p ablation and AICD [facility] 2015 likely from ventricular scarring) in the setting of NICM. He presented with multiple episodes of AICD firing since 21:30 on 12/24/22.  He received lidocaine bolus followed by lidocaine gtt 2mcg/min in the ED.  Preliminary interrogation of ICD showed 60 VT/VF events with 58 ATP and 20 ICD shocks. Baseline ECG showed sinus tachycardia 105 with frequent PVCs After 2 additional ICD shocks in ED, he received additional lidocaine bolus with metoprolol 5mg IVP, amiodarone 300 mg IV bolus followed by Amiodarone gtt.  He received Fentanyl 50 mCg and magnesium 2mg.  VT ablation now planned with Dr. LAURA Bullock for 12/27/22.

## 2022-12-27 LAB
ANION GAP SERPL CALC-SCNC: 9 MMOL/L — SIGNIFICANT CHANGE UP (ref 7–14)
BUN SERPL-MCNC: 18 MG/DL — SIGNIFICANT CHANGE UP (ref 7–23)
CALCIUM SERPL-MCNC: 9 MG/DL — SIGNIFICANT CHANGE UP (ref 8.4–10.5)
CHLORIDE SERPL-SCNC: 103 MMOL/L — SIGNIFICANT CHANGE UP (ref 98–107)
CO2 SERPL-SCNC: 26 MMOL/L — SIGNIFICANT CHANGE UP (ref 22–31)
CREAT SERPL-MCNC: 1.15 MG/DL — SIGNIFICANT CHANGE UP (ref 0.5–1.3)
EGFR: 85 ML/MIN/1.73M2 — SIGNIFICANT CHANGE UP
GLUCOSE SERPL-MCNC: 98 MG/DL — SIGNIFICANT CHANGE UP (ref 70–99)
HCT VFR BLD CALC: 40.3 % — SIGNIFICANT CHANGE UP (ref 39–50)
HGB BLD-MCNC: 13.3 G/DL — SIGNIFICANT CHANGE UP (ref 13–17)
MAGNESIUM SERPL-MCNC: 1.8 MG/DL — SIGNIFICANT CHANGE UP (ref 1.6–2.6)
MCHC RBC-ENTMCNC: 26.7 PG — LOW (ref 27–34)
MCHC RBC-ENTMCNC: 33 GM/DL — SIGNIFICANT CHANGE UP (ref 32–36)
MCV RBC AUTO: 80.8 FL — SIGNIFICANT CHANGE UP (ref 80–100)
NRBC # BLD: 0 /100 WBCS — SIGNIFICANT CHANGE UP (ref 0–0)
NRBC # FLD: 0 K/UL — SIGNIFICANT CHANGE UP (ref 0–0)
PHOSPHATE SERPL-MCNC: 4.9 MG/DL — HIGH (ref 2.5–4.5)
PLATELET # BLD AUTO: 212 K/UL — SIGNIFICANT CHANGE UP (ref 150–400)
POTASSIUM SERPL-MCNC: 4.1 MMOL/L — SIGNIFICANT CHANGE UP (ref 3.5–5.3)
POTASSIUM SERPL-SCNC: 4.1 MMOL/L — SIGNIFICANT CHANGE UP (ref 3.5–5.3)
RBC # BLD: 4.99 M/UL — SIGNIFICANT CHANGE UP (ref 4.2–5.8)
RBC # FLD: 12.2 % — SIGNIFICANT CHANGE UP (ref 10.3–14.5)
SODIUM SERPL-SCNC: 138 MMOL/L — SIGNIFICANT CHANGE UP (ref 135–145)
WBC # BLD: 6.64 K/UL — SIGNIFICANT CHANGE UP (ref 3.8–10.5)
WBC # FLD AUTO: 6.64 K/UL — SIGNIFICANT CHANGE UP (ref 3.8–10.5)

## 2022-12-27 PROCEDURE — 93623 PRGRMD STIMJ&PACG IV RX NFS: CPT | Mod: 26

## 2022-12-27 PROCEDURE — 99233 SBSQ HOSP IP/OBS HIGH 50: CPT

## 2022-12-27 PROCEDURE — 93654 COMPRE EP EVAL TX VT: CPT

## 2022-12-27 PROCEDURE — 93462 L HRT CATH TRNSPTL PUNCTURE: CPT

## 2022-12-27 PROCEDURE — 93662 INTRACARDIAC ECG (ICE): CPT | Mod: 26

## 2022-12-27 RX ORDER — APIXABAN 2.5 MG/1
5 TABLET, FILM COATED ORAL EVERY 12 HOURS
Refills: 0 | Status: DISCONTINUED | OUTPATIENT
Start: 2022-12-27 | End: 2022-12-29

## 2022-12-27 RX ORDER — AMIODARONE HYDROCHLORIDE 400 MG/1
200 TABLET ORAL DAILY
Refills: 0 | Status: CANCELLED | OUTPATIENT
Start: 2023-01-04 | End: 2022-12-29

## 2022-12-27 RX ORDER — AMIODARONE HYDROCHLORIDE 400 MG/1
400 TABLET ORAL EVERY 8 HOURS
Refills: 0 | Status: DISCONTINUED | OUTPATIENT
Start: 2022-12-27 | End: 2022-12-29

## 2022-12-27 RX ORDER — BENZOCAINE AND MENTHOL 5; 1 G/100ML; G/100ML
1 LIQUID ORAL THREE TIMES A DAY
Refills: 0 | Status: DISCONTINUED | OUTPATIENT
Start: 2022-12-27 | End: 2022-12-27

## 2022-12-27 RX ORDER — AMIODARONE HYDROCHLORIDE 400 MG/1
TABLET ORAL
Refills: 0 | Status: DISCONTINUED | OUTPATIENT
Start: 2022-12-27 | End: 2022-12-29

## 2022-12-27 RX ADMIN — CHLORHEXIDINE GLUCONATE 1 APPLICATION(S): 213 SOLUTION TOPICAL at 06:12

## 2022-12-27 RX ADMIN — APIXABAN 5 MILLIGRAM(S): 2.5 TABLET, FILM COATED ORAL at 20:01

## 2022-12-27 RX ADMIN — Medication 50 MILLIGRAM(S): at 21:29

## 2022-12-27 RX ADMIN — ATORVASTATIN CALCIUM 10 MILLIGRAM(S): 80 TABLET, FILM COATED ORAL at 21:29

## 2022-12-27 RX ADMIN — Medication 81 MILLIGRAM(S): at 17:02

## 2022-12-27 RX ADMIN — Medication 7.5 MG/MIN: at 06:07

## 2022-12-27 RX ADMIN — AMIODARONE HYDROCHLORIDE 400 MILLIGRAM(S): 400 TABLET ORAL at 21:29

## 2022-12-27 RX ADMIN — HEPARIN SODIUM 5000 UNIT(S): 5000 INJECTION INTRAVENOUS; SUBCUTANEOUS at 06:07

## 2022-12-27 RX ADMIN — Medication 3 MILLIGRAM(S): at 21:29

## 2022-12-27 NOTE — PROGRESS NOTE ADULT - SUBJECTIVE AND OBJECTIVE BOX
Patient seen and evaluated earlier today.  No significant events overnight.  Feels nervous pre-procedure, otherwise feels generally well. Family at bedside in [facility].    MEDICATIONS:  acetaminophen     Tablet .. 650 milliGRAM(s) Oral every 6 hours PRN  aspirin enteric coated 81 milliGRAM(s) Oral daily  atorvastatin 10 milliGRAM(s) Oral at bedtime  chlorhexidine 2% Cloths 1 Application(s) Topical <User Schedule>  heparin   Injectable 5000 Unit(s) SubCutaneous every 8 hours  lidocaine   Infusion 1 mG/Min IV Continuous <Continuous>  melatonin 3 milliGRAM(s) Oral at bedtime  metoprolol tartrate 50 milliGRAM(s) Oral every 8 hours  sodium chloride 0.9% lock flush 10 milliLiter(s) IV Push every 1 hour PRN    REVIEW OF SYSTEMS:  CONSTITUTIONAL: No fever, weight loss, or fatigue  NECK: No pain or stiffness  RESPIRATORY: No cough, wheezing, chills or hemoptysis; No Shortness of Breath  CARDIOVASCULAR: No chest pain, palpitations, passing out, dizziness, or leg swelling  GASTROINTESTINAL: No abdominal or epigastric pain. No nausea, vomiting, or hematemesis; No diarrhea or constipation. No melena or hematochezia.  NEUROLOGICAL: No headaches, memory loss, loss of strength, numbness, or tremors  SKIN: No itching, burning, rashes, or lesions   PSYCHIATRIC: No depression, +anxiety, no mood swings, or difficulty sleeping  HEME/LYMPH: No easy bruising, or bleeding gums  ALLERGY AND IMMUNOLOGIC: No hives or eczema	  All others negative    PHYSICAL EXAM:  T(C): 37.1 (12-27-22 @ 04:00), Max: 37.1 (12-26-22 @ 20:00)  HR: 72 (12-27-22 @ 07:00) (52 - 83)  BP: 102/72 (12-27-22 @ 07:00) (92/66 - 118/75)  RR: 15 (12-27-22 @ 07:00) (11 - 23)  SpO2: --  Wt(kg): --  I&O's Summary    26 Dec 2022 07:01  -  27 Dec 2022 07:00  --------------------------------------------------------  IN: 744.5 mL / OUT: 500 mL / NET: 244.5 mL    Appearance: Normal	  HEENT:   Normal oral mucosa, PERRL, EOMI	  Lymphatic: No lymphadenopathy  Cardiovascular: Normal S1 S2, No JVD, No murmurs, No edema  Chest: Left sided ICD incision benign  Respiratory: Lungs clear to auscultation	  Psychiatry: A & O x 3, Mood & affect appropriate  Gastrointestinal:  Soft, Non-tender, + BS	  Skin: No rashes, No ecchymoses, No cyanosis	  Neurologic: Non-focal  Extremities: Normal range of motion, No clubbing, cyanosis or edema  Vascular: Peripheral pulses palpable 2+ bilaterally    DIAGNOSTICS:  TELEMETRY: 	  as above    LABS:	 	                          13.3   6.64  )-----------( 212      ( 27 Dec 2022 04:41 )             40.3     12-27    138  |  103  |  18  ----------------------------<  98  4.1   |  26  |  1.15    Ca    9.0      27 Dec 2022 04:41  Phos  4.9     12-27  Mg     1.80     12-27    proBNP: Serum Pro-Brain Natriuretic Peptide: 290 pg/mL (12-24 @ 22:00)  Serum Pro-Brain Natriuretic Peptide: 191 pg/mL (06-26 @ 00:18)

## 2022-12-27 NOTE — PROGRESS NOTE ADULT - ASSESSMENT
38 yo M with a FH of premature SCD in mother (40s) & a PMH of NICM w/ reported normal cors at [facility] '15, VT, and s/p ablation/AICD [facility] '15 who p/w a cc of mult ICD shocks d/t PVC induced VF on 12/24/22. Initially amio loaded, now remains on Lido gtt & metoprolol 50 Q8H. Limited 2D TTE w/ mod LV sys dysfunction w/ inf & inferolateral wall hypokinesis. Neg C19 PCR 12/24/22. VSS:    - cont meds includin IV Lidocaine gtt & BB  - keep K+ 4.0 - 4.5 and Mg 2.0 - 2.5  - consented for RF ablation  - d/w EP attending

## 2022-12-27 NOTE — PROGRESS NOTE ADULT - SUBJECTIVE AND OBJECTIVE BOX
PROGRESS NOTE:   Authored by Maggie Prajapati DO , PGY1     Patient is a 37y old  Male who presents with a chief complaint of ICD firing (26 Dec 2022 08:52)      SUBJECTIVE / OVERNIGHT EVENTS:    All other review of systems is negative unless indicated above.    MEDICATIONS  (STANDING):  aspirin enteric coated 81 milliGRAM(s) Oral daily  atorvastatin 10 milliGRAM(s) Oral at bedtime  chlorhexidine 2% Cloths 1 Application(s) Topical <User Schedule>  heparin   Injectable 5000 Unit(s) SubCutaneous every 8 hours  lidocaine   Infusion 1 mG/Min (7.5 mL/Hr) IV Continuous <Continuous>  melatonin 3 milliGRAM(s) Oral at bedtime  metoprolol tartrate 50 milliGRAM(s) Oral every 8 hours    MEDICATIONS  (PRN):  acetaminophen     Tablet .. 650 milliGRAM(s) Oral every 6 hours PRN Temp greater or equal to 38C (100.4F), Mild Pain (1 - 3)  sodium chloride 0.9% lock flush 10 milliLiter(s) IV Push every 1 hour PRN Pre/post blood products, medications, blood draw, and to maintain line patency      CAPILLARY BLOOD GLUCOSE        I&O's Summary    26 Dec 2022 07:01  -  27 Dec 2022 07:00  --------------------------------------------------------  IN: 744.5 mL / OUT: 500 mL / NET: 244.5 mL        PHYSICAL EXAM:  Vital Signs Last 24 Hrs  T(C): 37.1 (27 Dec 2022 04:00), Max: 37.1 (26 Dec 2022 20:00)  T(F): 98.7 (27 Dec 2022 04:00), Max: 98.7 (26 Dec 2022 20:00)  HR: 72 (27 Dec 2022 07:00) (52 - 83)  BP: 102/72 (27 Dec 2022 07:00) (92/66 - 118/75)  BP(mean): 83 (27 Dec 2022 07:00) (72 - 88)  RR: 15 (27 Dec 2022 07:00) (11 - 23)  SpO2: --    Parameters below as of 26 Dec 2022 20:05  Patient On (Oxygen Delivery Method): room air        GENERAL: No acute distress, well-developed  HEAD:  Atraumatic, Normocephalic  EYES: EOMI, PERRLA, conjunctiva and sclera clear  NECK: Supple, no lymphadenopathy, no JVD  CHEST/LUNG: CTAB; No wheezes, rales, or rhonchi  HEART: Regular rate and rhythm; No murmurs, rubs, or gallops  ABDOMEN: Soft, non-tender, non-distended; normal bowel sounds, no organomegaly  EXTREMITIES:  2+ peripheral pulses b/l, No clubbing, cyanosis, or edema  NEUROLOGY: A&O x 3, no focal deficits  SKIN: No rashes or lesions    LABS:                        13.3   6.64  )-----------( 212      ( 27 Dec 2022 04:41 )             40.3     12-27    138  |  103  |  18  ----------------------------<  98  4.1   |  26  |  1.15    Ca    9.0      27 Dec 2022 04:41  Phos  4.9     12-27  Mg     1.80     12-27                Culture - Blood (collected 25 Dec 2022 01:23)  Source: .Blood Blood  Preliminary Report (26 Dec 2022 07:01):    No growth to date.        RADIOLOGY & ADDITIONAL TESTS:  Results Reviewed:   Imaging Personally Reviewed:  Electrocardiogram Personally Reviewed:    COORDINATION OF CARE:  Care Discussed with Consultants/Other Providers [Y/N]:  Prior or Outpatient Records Reviewed [Y/N]:   PROGRESS NOTE:   Authored by Maggie Prajapati DO , PGY1     Patient is a 37y old  Male who presents with a chief complaint of ICD firing (26 Dec 2022 08:52)      SUBJECTIVE / OVERNIGHT EVENTS:  Pt taken down for ablation. Unable to assess at bedside. Will exam pt once he returns   All other review of systems is negative unless indicated above.    MEDICATIONS  (STANDING):  aspirin enteric coated 81 milliGRAM(s) Oral daily  atorvastatin 10 milliGRAM(s) Oral at bedtime  chlorhexidine 2% Cloths 1 Application(s) Topical <User Schedule>  heparin   Injectable 5000 Unit(s) SubCutaneous every 8 hours  lidocaine   Infusion 1 mG/Min (7.5 mL/Hr) IV Continuous <Continuous>  melatonin 3 milliGRAM(s) Oral at bedtime  metoprolol tartrate 50 milliGRAM(s) Oral every 8 hours    MEDICATIONS  (PRN):  acetaminophen     Tablet .. 650 milliGRAM(s) Oral every 6 hours PRN Temp greater or equal to 38C (100.4F), Mild Pain (1 - 3)  sodium chloride 0.9% lock flush 10 milliLiter(s) IV Push every 1 hour PRN Pre/post blood products, medications, blood draw, and to maintain line patency      CAPILLARY BLOOD GLUCOSE        I&O's Summary    26 Dec 2022 07:01  -  27 Dec 2022 07:00  --------------------------------------------------------  IN: 744.5 mL / OUT: 500 mL / NET: 244.5 mL        PHYSICAL EXAM:  Vital Signs Last 24 Hrs  T(C): 37.1 (27 Dec 2022 04:00), Max: 37.1 (26 Dec 2022 20:00)  T(F): 98.7 (27 Dec 2022 04:00), Max: 98.7 (26 Dec 2022 20:00)  HR: 72 (27 Dec 2022 07:00) (52 - 83)  BP: 102/72 (27 Dec 2022 07:00) (92/66 - 118/75)  BP(mean): 83 (27 Dec 2022 07:00) (72 - 88)  RR: 15 (27 Dec 2022 07:00) (11 - 23)  SpO2: --    Parameters below as of 26 Dec 2022 20:05  Patient On (Oxygen Delivery Method): room air        GENERAL: No acute distress, well-developed  HEAD:  Atraumatic, Normocephalic  EYES: EOMI, PERRLA, conjunctiva and sclera clear  NECK: Supple, no lymphadenopathy, no JVD  CHEST/LUNG: CTAB; No wheezes, rales, or rhonchi  HEART: Regular rate and rhythm; No murmurs, rubs, or gallops  ABDOMEN: Soft, non-tender, non-distended; normal bowel sounds, no organomegaly  EXTREMITIES:  2+ peripheral pulses b/l, No clubbing, cyanosis, or edema  NEUROLOGY: A&O x 3, no focal deficits  SKIN: No rashes or lesions    LABS:                        13.3   6.64  )-----------( 212      ( 27 Dec 2022 04:41 )             40.3     12-27    138  |  103  |  18  ----------------------------<  98  4.1   |  26  |  1.15    Ca    9.0      27 Dec 2022 04:41  Phos  4.9     12-27  Mg     1.80     12-27                Culture - Blood (collected 25 Dec 2022 01:23)  Source: .Blood Blood  Preliminary Report (26 Dec 2022 07:01):    No growth to date.        RADIOLOGY & ADDITIONAL TESTS:  Results Reviewed:   Imaging Personally Reviewed:  Electrocardiogram Personally Reviewed:    COORDINATION OF CARE:  Care Discussed with Consultants/Other Providers [Y/N]:  Prior or Outpatient Records Reviewed [Y/N]:

## 2022-12-27 NOTE — PROGRESS NOTE ADULT - ASSESSMENT
37M PMHx: NICM, (s/p cath [facility] 2015, "normal cath"), Ventricular tachycardia (s/p ablation and AICD [facility] 2015 likely from ventricular scarring) presented from home for multiple episodes of AICD firing that began at 9:30pm while watching TV. In ED patient initially received Lidocaine bolus and Lidocaine gtt 2mg/hr.  Preliminary interrogation of ICD shows 60 VT/VF with 58 ATP and 20 ICD shocks. Rhythm VTach 200s with unsuccessful ATPs followed by shock.  ECG  with frequent PVCs.  Patient had 2 subsequent shocks in ED.  Additional lidocaine bolus given with Metoprolol 5mg IVP, Amiodarone 300mg and started on Amiodarone gtt.  Patient received Fentanyl 50mcg and Magnesium 2mg.  Patient reports he had a Moderna Covid Booster on 12/23/22 and woke up with malaise sleeping most of day.  He denies fever, chills, cough, recent sick contact, syncope, SOB, BAXTER, N/V/D.  Patient reports he is under the care of Dr. Arteaga at [place] and last saw him early this month.  He report he had 1 previous firing 6-7 years ago with no subsequent firing until now.     Neuro:  - A&Ox3   - No issues     Resp:  - Satting well on RA    Cardiac:  #Ventricular tachycardia  - Moderna Covid Booster on 12/23/22  - limited TTE with mild to moderate LV dysfunction w/ hypokinesis of inf and inf.lateral walls  - Dec Lidocaine gtt to 0.5mg  - d/c Amio gtt 0.5mg, now on amio 400 TID load for 10G   - Continue lopressor 50mg TID   - Central line placed for IV Amio  - Maintain K >4 and Mag >2  - EP following, plan for ablation with Dr. Bullock tomorrow     GI:   - NPO after midnight for ablation      /RENAL:  - Nl Scr     ID  - Normal WBC this morning  - RVP neg     ENDO  - no active issues     DVT Ppx: SubQ hep    37M PMHx: NICM, (s/p cath [facility] 2015, "normal cath"), Ventricular tachycardia (s/p ablation and AICD [facility] 2015 likely from ventricular scarring) presented from home for multiple episodes of AICD firing that began at 9:30pm while watching TV. In ED patient initially received Lidocaine bolus and Lidocaine gtt 2mg/hr.  Preliminary interrogation of ICD shows 60 VT/VF with 58 ATP and 20 ICD shocks. Rhythm VTach 200s with unsuccessful ATPs followed by shock.  ECG  with frequent PVCs.  Patient had 2 subsequent shocks in ED.  Additional lidocaine bolus given with Metoprolol 5mg IVP, Amiodarone 300mg and started on Amiodarone gtt.  Patient received Fentanyl 50mcg and Magnesium 2mg.  Patient reports he had a Moderna Covid Booster on 12/23/22 and woke up with malaise sleeping most of day.  He denies fever, chills, cough, recent sick contact, syncope, SOB, BAXTER, N/V/D.  Patient reports he is under the care of Dr. Arteaga at [place] and last saw him early this month.  He report he had 1 previous firing 6-7 years ago with no subsequent firing until now.     Neuro:  - A&Ox3   - No issues     Resp:  - Satting well on RA    Cardiac:  #Ventricular tachycardia  - Moderna Covid Booster on 12/23/22  - limited TTE with mild to moderate LV dysfunction w/ hypokinesis of inf and inf.lateral walls  - Dec Lidocaine gtt to 0.5mg  - d/c Amio in preparation for ablation   - Continue lopressor 50mg TID   - Central line placed for IV Amio, can remove after ablation   - Maintain K >4 and Mag >2  - EP following, plan for ablation with Dr. Bullock today     GI:   - Regular      /RENAL:  - Nl Scr     ID  - Normal WBC this morning  - RVP neg     ENDO  - no active issues     DVT Ppx: SubQ hep

## 2022-12-28 ENCOUNTER — TRANSCRIPTION ENCOUNTER (OUTPATIENT)
Age: 37
End: 2022-12-28

## 2022-12-28 LAB
ANION GAP SERPL CALC-SCNC: 8 MMOL/L — SIGNIFICANT CHANGE UP (ref 7–14)
APTT BLD: 29.4 SEC — SIGNIFICANT CHANGE UP (ref 27–36.3)
BUN SERPL-MCNC: 11 MG/DL — SIGNIFICANT CHANGE UP (ref 7–23)
CALCIUM SERPL-MCNC: 8.5 MG/DL — SIGNIFICANT CHANGE UP (ref 8.4–10.5)
CHLORIDE SERPL-SCNC: 108 MMOL/L — HIGH (ref 98–107)
CO2 SERPL-SCNC: 24 MMOL/L — SIGNIFICANT CHANGE UP (ref 22–31)
CREAT SERPL-MCNC: 1.04 MG/DL — SIGNIFICANT CHANGE UP (ref 0.5–1.3)
EGFR: 95 ML/MIN/1.73M2 — SIGNIFICANT CHANGE UP
GLUCOSE SERPL-MCNC: 107 MG/DL — HIGH (ref 70–99)
HCT VFR BLD CALC: 37.7 % — LOW (ref 39–50)
HGB BLD-MCNC: 12.4 G/DL — LOW (ref 13–17)
INR BLD: 1.49 RATIO — HIGH (ref 0.88–1.16)
MAGNESIUM SERPL-MCNC: 1.9 MG/DL — SIGNIFICANT CHANGE UP (ref 1.6–2.6)
MCHC RBC-ENTMCNC: 26.4 PG — LOW (ref 27–34)
MCHC RBC-ENTMCNC: 32.9 GM/DL — SIGNIFICANT CHANGE UP (ref 32–36)
MCV RBC AUTO: 80.2 FL — SIGNIFICANT CHANGE UP (ref 80–100)
NRBC # BLD: 0 /100 WBCS — SIGNIFICANT CHANGE UP (ref 0–0)
NRBC # FLD: 0 K/UL — SIGNIFICANT CHANGE UP (ref 0–0)
PHOSPHATE SERPL-MCNC: 3.5 MG/DL — SIGNIFICANT CHANGE UP (ref 2.5–4.5)
PLATELET # BLD AUTO: 225 K/UL — SIGNIFICANT CHANGE UP (ref 150–400)
POTASSIUM SERPL-MCNC: 4.1 MMOL/L — SIGNIFICANT CHANGE UP (ref 3.5–5.3)
POTASSIUM SERPL-SCNC: 4.1 MMOL/L — SIGNIFICANT CHANGE UP (ref 3.5–5.3)
PROTHROM AB SERPL-ACNC: 17.4 SEC — HIGH (ref 10.5–13.4)
RBC # BLD: 4.7 M/UL — SIGNIFICANT CHANGE UP (ref 4.2–5.8)
RBC # FLD: 12.2 % — SIGNIFICANT CHANGE UP (ref 10.3–14.5)
SODIUM SERPL-SCNC: 140 MMOL/L — SIGNIFICANT CHANGE UP (ref 135–145)
WBC # BLD: 8.85 K/UL — SIGNIFICANT CHANGE UP (ref 3.8–10.5)
WBC # FLD AUTO: 8.85 K/UL — SIGNIFICANT CHANGE UP (ref 3.8–10.5)

## 2022-12-28 PROCEDURE — 99232 SBSQ HOSP IP/OBS MODERATE 35: CPT

## 2022-12-28 PROCEDURE — 99233 SBSQ HOSP IP/OBS HIGH 50: CPT

## 2022-12-28 RX ORDER — METOPROLOL TARTRATE 50 MG
100 TABLET ORAL DAILY
Refills: 0 | Status: DISCONTINUED | OUTPATIENT
Start: 2022-12-28 | End: 2022-12-29

## 2022-12-28 RX ORDER — APIXABAN 2.5 MG/1
1 TABLET, FILM COATED ORAL
Qty: 60 | Refills: 0
Start: 2022-12-28 | End: 2023-01-26

## 2022-12-28 RX ORDER — MAGNESIUM SULFATE 500 MG/ML
1 VIAL (ML) INJECTION ONCE
Refills: 0 | Status: COMPLETED | OUTPATIENT
Start: 2022-12-28 | End: 2022-12-28

## 2022-12-28 RX ADMIN — AMIODARONE HYDROCHLORIDE 400 MILLIGRAM(S): 400 TABLET ORAL at 21:08

## 2022-12-28 RX ADMIN — Medication 3 MILLIGRAM(S): at 21:08

## 2022-12-28 RX ADMIN — ATORVASTATIN CALCIUM 10 MILLIGRAM(S): 80 TABLET, FILM COATED ORAL at 21:08

## 2022-12-28 RX ADMIN — APIXABAN 5 MILLIGRAM(S): 2.5 TABLET, FILM COATED ORAL at 19:28

## 2022-12-28 RX ADMIN — Medication 100 GRAM(S): at 06:18

## 2022-12-28 RX ADMIN — APIXABAN 5 MILLIGRAM(S): 2.5 TABLET, FILM COATED ORAL at 07:57

## 2022-12-28 RX ADMIN — AMIODARONE HYDROCHLORIDE 400 MILLIGRAM(S): 400 TABLET ORAL at 06:01

## 2022-12-28 RX ADMIN — Medication 50 MILLIGRAM(S): at 06:01

## 2022-12-28 RX ADMIN — AMIODARONE HYDROCHLORIDE 400 MILLIGRAM(S): 400 TABLET ORAL at 13:21

## 2022-12-28 RX ADMIN — CHLORHEXIDINE GLUCONATE 1 APPLICATION(S): 213 SOLUTION TOPICAL at 06:02

## 2022-12-28 RX ADMIN — Medication 100 MILLIGRAM(S): at 13:20

## 2022-12-28 RX ADMIN — Medication 81 MILLIGRAM(S): at 17:37

## 2022-12-28 NOTE — PROGRESS NOTE ADULT - ASSESSMENT
37M PMHx: NICM, (s/p cath [facility] 2015, "normal cath"), Ventricular tachycardia (s/p ablation and AICD [facility] 2015 likely from ventricular scarring) presented from home for multiple episodes of AICD firing that began at 9:30pm while watching TV. In ED patient initially received Lidocaine bolus and Lidocaine gtt 2mg/hr.  Preliminary interrogation of ICD shows 60 VT/VF with 58 ATP and 20 ICD shocks. Rhythm VTach 200s with unsuccessful ATPs followed by shock.  ECG  with frequent PVCs.  Patient had 2 subsequent shocks in ED.  Additional lidocaine bolus given with Metoprolol 5mg IVP, Amiodarone 300mg and started on Amiodarone gtt.  Patient received Fentanyl 50mcg and Magnesium 2mg.  Patient reports he had a Moderna Covid Booster on 12/23/22 and woke up with malaise sleeping most of day.  He denies fever, chills, cough, recent sick contact, syncope, SOB, BAXTER, N/V/D.  Patient reports he is under the care of Dr. Arteaga at [place] and last saw him early this month.  He report he had 1 previous firing 6-7 years ago with no subsequent firing until now.     Neuro:  - A&Ox3   - No issues     Resp:  - Satting well on RA    Cardiac:  #Ventricular tachycardia  - Moderna Covid Booster on 12/23/22  - limited TTE with mild to moderate LV dysfunction w/ hypokinesis of inf and inf.lateral walls  - Dec Lidocaine gtt to 0.5mg  - [place] lopressor 50mg TID, started Toprol XL 100qd   - Central line placed for IV Amio, can remove after ablation   - Maintain K >4 and Mag >2  - s/p ablation on 12/28   - amio load restarted after ablation   - started Eliquis 5 BID     GI:   - Regular      /RENAL:  - Nl Scr     ID  - Normal WBC   - RVP neg     ENDO  - no active issues     DVT Ppx: SubQ hep

## 2022-12-28 NOTE — DISCHARGE NOTE PROVIDER - NSDCCPCAREPLAN_GEN_ALL_CORE_FT
PRINCIPAL DISCHARGE DIAGNOSIS  Diagnosis: Ventricular tachycardia  Assessment and Plan of Treatment: You came to the hospital after you had multiple episodes of ventricular tachycardia and recevied several shocks from your ICD. You were admitted to Cardiac ICU where you were placed on an amiodarone and lidocaine drip. You later received and ablation on 12/27/2022. We monitored your heart rate and rhythmn very closely. You are now stable for discharge and should followup with  on January 25, 2023 at 8:30 am. You should take medications as described below   Medications   - Eliquis 5mg twice daily   - Aspirin  81mg daily   - Amiodarone 400 mg every 8 hours for ____ and then 200mg daily   - Toprol XL 100mg daily   - Atorvastatin 10mg at bedtime       PRINCIPAL DISCHARGE DIAGNOSIS  Diagnosis: Ventricular tachycardia  Assessment and Plan of Treatment: You came to the hospital after you had multiple episodes of ventricular tachycardia and received several shocks from your ICD. You were admitted to Cardiac ICU where you were placed on an amiodarone and lidocaine drip. You later received and ablation on 12/27/2022. You were started on oral amiodarone and Eliquis. We monitored your heart rate and rhythm very closely. You are now stable for discharge and should followup with Dr. Arteaga on January 9, 2023 at 12:30 am. You should take medications as described below   - Eliquis 5mg every 12 hours  - Aspirin 81mg daily  - Amiodarone 400mg every 8 hours to complete 10gram load. (17 more doses until January 4th). Then you should take 200mg daily  - Toprol XL 100mg daily   - Atorvastatin 10mg at bedtime       PRINCIPAL DISCHARGE DIAGNOSIS  Diagnosis: Ventricular tachycardia  Assessment and Plan of Treatment: You came to the hospital after you had multiple episodes of ventricular tachycardia and received several shocks from your ICD. You were admitted to Cardiac ICU where you were placed on an amiodarone and lidocaine drip. You later received and ablation on 12/27/2022. You were started on oral amiodarone and Eliquis. We monitored your heart rate and rhythm very closely. You are now stable for discharge and should followup with Dr. Arteaga on January 9, 2023 at 12:30 am. You should take medications as described below   - Eliquis 5mg every 12 hours  - Aspirin 81mg daily  - Amiodarone 400mg every 8 hours (3 times per day) until January 3, 2023. Then you should take 200mg daily  - Toprol XL 100mg daily   - Atorvastatin 10mg at bedtime      SECONDARY DISCHARGE DIAGNOSES  Diagnosis: Rash, skin  Assessment and Plan of Treatment: You developed a skin rash on your upper arms. The rash is likely due to irritation from the blood pressure cuff. You can apply hydrocortisone ointment as needed to the area. If notice worsening of the rash or if the rash becomes painful, you should seek immediate medical attention

## 2022-12-28 NOTE — PROGRESS NOTE ADULT - ASSESSMENT
38 yo male with past medical history of DCM (normal cath at [facility] 2015), ventricular tachycardia s/p ablation and ICD implant 2015 who presented from home after receiving multiple ICD shocks while watching television. Device interrogation showed 60 VT/VF episodes resulting in ATP 58 times and 20 ICD shocks. Patient underwent successful VT ablation yesterday. Post procedure ICD interrogation showed RV sensing, threshold and impedances unchanged.   Now on 10 gram Amiodarone load. No VT overnight.   On Eliquis 5mg bid x 30 days.   Post procedure ablation teaching done with patient and his family.  Written instructions and contact information provided.   All questions answered.   Labs reviewed.   Keep K+ > 4.0 and Mg > 2.0.   He has a follow-up appointment with Dr. Gurrola on 1/25/23 at 8:30am  4th floor Oncology Building [phone].    38 yo male with past medical history of DCM (normal cath at [facility] 2015), ventricular tachycardia s/p ablation and ICD implant 2015 who presented from home after receiving multiple ICD shocks while watching television. Device interrogation showed 60 VT/VF episodes resulting in ATP 58 times and 20 ICD shocks. Patient underwent successful VT ablation yesterday. Post procedure ICD interrogation showed RV sensing, threshold and impedances unchanged.   Now on 10 gram Amiodarone load. No VT overnight.   On Eliquis 5mg bid x 30 days.   Post procedure ablation teaching done with patient and his family.  Written instructions and contact information provided.   All questions answered.   Labs reviewed.   Keep K+ > 4.0 and Mg > 2.0.

## 2022-12-28 NOTE — DISCHARGE NOTE PROVIDER - HOSPITAL COURSE
HPI  This is a 37 year old with past medical history of Ventricular tachycardia (s/p ablation and AICD [facility] 2015 likely from ventricular scarring) presented from home for multiple episodes of AICD firing that began at 9:30pm while watching TV. In ED patient initially received Lidocaine bolus and Lidocaine gtt 2mcg/min.  Preliminary interrogation of ICD shows 60 VT/VF with 58 ATP and 20 ICD shocks. Rhythm VTach 200s with unsuccessful ATPs followed by shock.  ECG  with frequent PVCs.  Patient had 2 subsequent shocks in ED.  Additional lidocaine bolus given with Metoprolol 5mg IVP, Amiodarone 300mg and started on Amiodarone gtt.  Patient received Fentanyl 50mcg and Magnesium 2mg.  Patient reports he had a Moderna Covid Booster on 12/23/22 and woke up with maltejase, sleeping most of day.  He denies fever, chills, cough, recent sick contact, syncope, SOB, BAXTER, N/V/D.  Patient reports he is under the care of Dr. Arteaga at [place] and last saw him early this month.  He report he had 1 previous firing 6-7 years ago with no subsequent firing until now.     On initial assessment in ED, patient appears anxious, A+OX3, , /71, temp 99.0F, euvolemic, lungs sounds clear in all fields, rr 16, No JVD, No pedal edema.  Labs show mild leukocytosis with WBC 12.5, no anemia, creatinine 1.22 Na 134, K+ 3.1 Lactate 3.1. (24 Dec 2022 23:09)    Hospital course   Pt was admitted to the CCU and was started on and amiodarone and lidocaine drip. Pt received an ablation on 12/27/2022 and was started on an oral 10G amiodarone load and eliquis 5q12. He was monitored for >24hours post ablation and was hemodynamically stable on discharge. His lopressor 50q8 was transitioned to Toprol 100 qd.     Discharge meds   - Eliquis 5q12  - ASA 81qd   - Amiodarone 400q8 to complete 10gram load  - Toprol  qd   - atorvostatin 10mg qd         You came to the hospital after you had multiple episodes of ventricular tachycardia and received several shocks from your ICD. You were admitted to Cardiac ICU where you were placed on an amiodarone and lidocaine drip. You later received and ablation on 12/27/2022. We monitored your heart rate and rhythm very closely. You are now stable for discharge and should followup with Dr. Gurrola on January 25, 2023 at 8:30 am. You should take medications as described below    HPI  This is a 37 year old with past medical history of Ventricular tachycardia (s/p ablation and AICD [facility] 2015 likely from ventricular scarring) presented from home for multiple episodes of AICD firing that began at 9:30pm while watching TV. In ED patient initially received Lidocaine bolus and Lidocaine gtt 2mcg/min.  Preliminary interrogation of ICD shows 60 VT/VF with 58 ATP and 20 ICD shocks. Rhythm VTach 200s with unsuccessful ATPs followed by shock.  ECG  with frequent PVCs.  Patient had 2 subsequent shocks in ED.  Additional lidocaine bolus given with Metoprolol 5mg IVP, Amiodarone 300mg and started on Amiodarone gtt.  Patient received Fentanyl 50mcg and Magnesium 2mg.  Patient reports he had a Moderna Covid Booster on 12/23/22 and woke up with maliase, sleeping most of day.  He denies fever, chills, cough, recent sick contact, syncope, SOB, BAXTER, N/V/D.  Patient reports he is under the care of Dr. Arteaga at [place] and last saw him early this month.  He report he had 1 previous firing 6-7 years ago with no subsequent firing until now.     On initial assessment in ED, patient appears anxious, A+OX3, , /71, temp 99.0F, euvolemic, lungs sounds clear in all fields, rr 16, No JVD, No pedal edema.  Labs show mild leukocytosis with WBC 12.5, no anemia, creatinine 1.22 Na 134, K+ 3.1 Lactate 3.1. (24 Dec 2022 23:09)    Hospital course   Pt was admitted to the CCU and was started on and amiodarone and lidocaine drip. Pt received an ablation on 12/27/2022 and was started on an oral 10G amiodarone load and eliquis 5q12. He was monitored for >24hours post ablation and was hemodynamically stable on discharge. His lopressor 50q8 was transitioned to Toprol 100 qd.     Discharge meds   - Eliquis 5q12  - ASA 81qd   - Amiodarone 400q8 to complete 10gram load  - Toprol  qd   - atorvostatin 10mg qd          HPI  This is a 37 year old with past medical history of Ventricular tachycardia (s/p ablation and AICD [facility] 2015 likely from ventricular scarring) presented from home for multiple episodes of AICD firing that began at 9:30pm while watching TV. In ED patient initially received Lidocaine bolus and Lidocaine gtt 2mcg/min.  Preliminary interrogation of ICD shows 60 VT/VF with 58 ATP and 20 ICD shocks. Rhythm VTach 200s with unsuccessful ATPs followed by shock.  ECG  with frequent PVCs.  Patient had 2 subsequent shocks in ED.  Additional lidocaine bolus given with Metoprolol 5mg IVP, Amiodarone 300mg and started on Amiodarone gtt.  Patient received Fentanyl 50mcg and Magnesium 2mg.  Patient reports he had a Moderna Covid Booster on 12/23/22 and woke up with maliase, sleeping most of day.  He denies fever, chills, cough, recent sick contact, syncope, SOB, BAXTER, N/V/D.  Patient reports he is under the care of Dr. Arteaga at [place] and last saw him early this month.  He report he had 1 previous firing 6-7 years ago with no subsequent firing until now.     On initial assessment in ED, patient appears anxious, A+OX3, , /71, temp 99.0F, euvolemic, lungs sounds clear in all fields, rr 16, No JVD, No pedal edema.  Labs show mild leukocytosis with WBC 12.5, no anemia, creatinine 1.22 Na 134, K+ 3.1 Lactate 3.1. (24 Dec 2022 23:09)    Hospital course   Pt was admitted to the CCU and was started on and amiodarone and lidocaine drip. Pt received an ablation on 12/27/2022 and was started on an oral 10G amiodarone load and eliquis 5q12. He was monitored for >24hours post ablation and was hemodynamically stable on discharge. His lopressor 50q8 was transitioned to Toprol 100 qd.     Discharge meds   - Eliquis 5q12  - ASA 81qd   - Amiodarone 400q8 to complete 10gram load  - Toprol  qd   - atorvostatin 10mg qd

## 2022-12-28 NOTE — DISCHARGE NOTE PROVIDER - CARE PROVIDER_API CALL
Derick Gurrola)  Cardiovascular Disease; Internal Medicine  [address] [place] [address]  Phone: [phone]  Fax: [phone]  Scheduled Appointment: 01/25/2023 08:30 AM   Alex Arteaga  [address] [address]  Phone: [phone]  Fax: (   )    -  Established Patient  Scheduled Appointment: 01/09/2023 12:30 PM

## 2022-12-28 NOTE — DISCHARGE NOTE PROVIDER - NSDCFUSCHEDAPPT_GEN_ALL_CORE_FT
Derick Gurrola  [facility]  [address] [id number]  Scheduled Appointment: 01/25/2023

## 2022-12-28 NOTE — PROGRESS NOTE ADULT - SUBJECTIVE AND OBJECTIVE BOX
PROGRESS NOTE:   Authored by Maggie Prajapati DO , PGY1     Patient is a 37y old  Male who presents with a chief complaint of ICD firing (28 Dec 2022 11:01)      SUBJECTIVE / OVERNIGHT EVENTS:  Pt seen and examined at bedside. States he is feeling well and denies any CP, palpitations, SOB, dizziness, HA, lightheadedness   All other review of systems is negative unless indicated above.    MEDICATIONS  (STANDING):  aMIOdarone    Tablet   Oral   aMIOdarone    Tablet 400 milliGRAM(s) Oral every 8 hours  apixaban 5 milliGRAM(s) Oral every 12 hours  aspirin enteric coated 81 milliGRAM(s) Oral daily  atorvastatin 10 milliGRAM(s) Oral at bedtime  chlorhexidine 2% Cloths 1 Application(s) Topical <User Schedule>  melatonin 3 milliGRAM(s) Oral at bedtime  metoprolol succinate  milliGRAM(s) Oral daily    MEDICATIONS  (PRN):  acetaminophen     Tablet .. 650 milliGRAM(s) Oral every 6 hours PRN Temp greater or equal to 38C (100.4F), Mild Pain (1 - 3)  benzonatate 100 milliGRAM(s) Oral three times a day PRN Cough/ sore throat  sodium chloride 0.9% lock flush 10 milliLiter(s) IV Push every 1 hour PRN Pre/post blood products, medications, blood draw, and to maintain line patency      CAPILLARY BLOOD GLUCOSE        I&O's Summary    27 Dec 2022 07:01  -  28 Dec 2022 07:00  --------------------------------------------------------  IN: 160 mL / OUT: 1200 mL / NET: -1040 mL        PHYSICAL EXAM:  Vital Signs Last 24 Hrs  T(C): 36.6 (28 Dec 2022 08:00), Max: 36.8 (27 Dec 2022 16:00)  T(F): 97.9 (28 Dec 2022 08:00), Max: 98.2 (27 Dec 2022 16:00)  HR: 60 (28 Dec 2022 11:00) (58 - 87)  BP: 96/60 (28 Dec 2022 11:00) (91/57 - 99/70)  BP(mean): 71 (28 Dec 2022 11:00) (68 - 79)  RR: 21 (28 Dec 2022 11:00) (9 - 21)  SpO2: 99% (28 Dec 2022 11:00) (96% - 99%)    Parameters below as of 28 Dec 2022 04:00  Patient On (Oxygen Delivery Method): room air        GENERAL: No acute distress, well-developed  HEAD:  Atraumatic, Normocephalic  EYES: EOMI, PERRLA, conjunctiva and sclera clear  NECK: Supple, no lymphadenopathy, no JVD  CHEST/LUNG: CTAB; No wheezes, rales, or rhonchi  HEART: Regular rate and rhythm; No murmurs, rubs, or gallops  ABDOMEN: Soft, non-tender, non-distended; normal bowel sounds, no organomegaly  EXTREMITIES:  2+ peripheral pulses b/l, No clubbing, cyanosis, or edema  NEUROLOGY: A&O x 3, no focal deficits  SKIN: No rashes or lesions    LABS:                        12.4   8.85  )-----------( 225      ( 28 Dec 2022 04:12 )             37.7     12-28    140  |  108<H>  |  11  ----------------------------<  107<H>  4.1   |  24  |  1.04    Ca    8.5      28 Dec 2022 04:12  Phos  3.5     12-28  Mg     1.90     12-28      PT/INR - ( 28 Dec 2022 04:12 )   PT: 17.4 sec;   INR: 1.49 ratio         PTT - ( 28 Dec 2022 04:12 )  PTT:29.4 sec            RADIOLOGY & ADDITIONAL TESTS:  Results Reviewed:   Imaging Personally Reviewed:  Electrocardiogram Personally Reviewed:    COORDINATION OF CARE:  Care Discussed with Consultants/Other Providers [Y/N]:  Prior or Outpatient Records Reviewed [Y/N]:

## 2022-12-28 NOTE — PROGRESS NOTE ADULT - SUBJECTIVE AND OBJECTIVE BOX
Patient s/p VT ablation yesterday. Tolerated the procedure well. No complications.   Denies chest pain, shortness of breath, palpitations or dizziness overnight.   Telemetry reviewed. Sinus michi -> normal sinus rhythm 50-80's. Rare PVC's.       Vital Signs Last 24 Hrs  T(C): 36.6 (28 Dec 2022 08:00), Max: 36.8 (27 Dec 2022 16:00)  T(F): 97.9 (28 Dec 2022 08:00), Max: 98.2 (27 Dec 2022 16:00)  HR: 59 (28 Dec 2022 07:00) (59 - 87)  BP: 95/61 (28 Dec 2022 07:00) (91/57 - 95/61)  BP(mean): 72 (28 Dec 2022 07:00) (68 - 72)  RR: 20 (28 Dec 2022 07:00) (9 - 20)  SpO2: 97% (28 Dec 2022 07:00) (96% - 99%)    Parameters below as of 28 Dec 2022 04:00  Patient On (Oxygen Delivery Method): room air      EKG: pending  Telemetry: Sinus bradycardia 50's. Rare PVC. No NSVT.     MEDICATIONS  (STANDING):  aMIOdarone    Tablet   Oral   aMIOdarone    Tablet 400 milliGRAM(s) Oral every 8 hours  apixaban 5 milliGRAM(s) Oral every 12 hours  aspirin enteric coated 81 milliGRAM(s) Oral daily  atorvastatin 10 milliGRAM(s) Oral at bedtime  chlorhexidine 2% Cloths 1 Application(s) Topical <User Schedule>  melatonin 3 milliGRAM(s) Oral at bedtime  metoprolol tartrate 50 milliGRAM(s) Oral every 8 hours    MEDICATIONS  (PRN):  acetaminophen     Tablet .. 650 milliGRAM(s) Oral every 6 hours PRN Temp greater or equal to 38C (100.4F), Mild Pain (1 - 3)  benzonatate 100 milliGRAM(s) Oral three times a day PRN Cough/ sore throat  sodium chloride 0.9% lock flush 10 milliLiter(s) IV Push every 1 hour PRN Pre/post blood products, medications, blood draw, and to maintain line patency          Physical exam:   Gen- well developed well nourished NAD  Resp- clear to auscultation. No wheezing, rales or rhonchi  CV- S1 and S2 RRR. No murmurs, gallops or rubs  ABD- soft nontender + bowel sounds  EXT- no edema.  Strong and equal pedal pulses. Right groin without bleeding, hematoma or ecchymosis.   Neuro- grossly nonfocal                            12.4   8.85  )-----------( 225      ( 28 Dec 2022 04:12 )             37.7     PT/INR - ( 28 Dec 2022 04:12 )   PT: 17.4 sec;   INR: 1.49 ratio         PTT - ( 28 Dec 2022 04:12 )  PTT:29.4 sec  12-28    140  |  108<H>  |  11  ----------------------------<  107<H>  4.1   |  24  |  1.04    Ca    8.5      28 Dec 2022 04:12  Phos  3.5     12-28  Mg     1.90     12-28      < from: Transthoracic Echocardiogram (12.25.22 @ 12:09) >  Patient name: VINNY GRACE  YOB: 1985   Age: 37 (M)   MR#: [id number]  Study Date: 12/25/2022  [facility]  Study quality: Very Limited  Referring Physician: Savage Winslow MD  Blood Pressure: 117/79 mmHg  Height: 175 cm  Weight: 77 kg  BSA: 1.9 m2  Heart Rate: 79 mmHg  ------------------------------------------------------------------------  PROCEDURE: Transthoracic echocardiogram with 2-D, M-Mode  and complete spectral and color flow Doppler.  INDICATION: Ventricular tachycardia (I47.2)  ------------------------------------------------------------------------  DIMENSIONS:  Dimensions:     Normal Values:  LA:     3.7 cm    2.0 - 4.0 cm  Ao:     3.3 cm    2.0 - 3.8 cm  SEPTUM: 1.1 cm    0.6 - 1.2 cm  PWT:    1.1 cm    0.6 - 1.1 cm  LVIDd:  4.5 cm    3.0 - 5.6 cm  LVIDs:  2.7 cm    1.8 - 4.0 cm  Derived Variables:  LVMI: 91 g/m2  RWT: 0.48  Fractional short: 40 %  ------------------------------------------------------------------------  OBSERVATIONS:  Aortic Root: Normal aortic root.  Aortic Valve: Normal trileaflet aortic valve.  Left Atrium: Normal left atrium.  LA volume index = 30  cc/m2.  Left Ventricle: Endocardium not well visualized; grossly at  least mild tomoderate left ventricular dysfunction with  hypokinesis of the inferior and inferolateral walls  Pericardium/PleuraNormal pericardium with no pericardial  effusion.  Hemodynamic: Estimated right ventricular systolic pressure  equals 30 mm Hg, assuming right atrial pressure equals 10  mm Hg, consistent with normal pulmonary pressures.  ------------------------------------------------------------------------  CONCLUSIONS:  Very limited TTE  1. Endocardium not well visualized; grossly at least mild  to moderate left ventricular dysfunction with hypokinesis  of the inferior and inferolateral walls  ------------------------------------------------------------------------  Confirmed on  12/25/2022 - 18:40:33 by Genoveva Cast MD  ------------------------------------------------------------------------

## 2022-12-28 NOTE — DISCHARGE NOTE PROVIDER - NSDCCPTREATMENT_GEN_ALL_CORE_FT
PRINCIPAL PROCEDURE  Procedure: Cardiac ablation  Findings and Treatment: Study Date:     2022   Name:           VINNY GRACE   :            1985   (37 years)   Gender:         male   MR#:            [id number]   MPI#:   Patient Class:  Inpatient   EP with Ablation Report   Electrophysiologist:           Derick Gurrola MD   Indications   Ventricular tachycardia   Primary ICD-10 CM   I47.2 - Ventricular tachycardia   CPT Code:                  95755 - Ablation, VT/PVC     71239 - Ultrasound Guidance for vascular access     08547 - Intracardiac Ultrasound (Ice)     51946 - Transeptal Puncture of Intact Interatrial Septum     70612 - Programmed stimulation and pacing after intravenous drug  infusion    84409 -  Reina-procedural device evaluation and programming, ICD   Procedures Performed   Primary Procedures:      PVC/ VT Ablation (structural heart disease)   Conclusions   - Inducible VT with RBBB, right superior axis at 430 ms   - PVCs mapped to the anterolateral papillary muscle   - Large baso-lateral scar, previously noted   - Hemoginization of baso-lateral scar   - Anterolateral pap muscle PVC ablation   - Non-inducible post ablation on and off dobutamine   Follow-Up   - Bedrest for 2 hours   - Resume Amio load   Patient: VINNY GRACE           MRN: [id number]  Study Date: 2022   11:03 AM      Page 1 of 5  Exam Date: 2022, Exam No.: [id number]   - Stop Lidocaine   - Start Eliquis 5mb BID x30 days   - Can follow up with Dr Arteaga as outpatient   Complications   No complications          PRINCIPAL PROCEDURE  Procedure: Cardiac ablation  Findings and Treatment: Study Date:     2022   Name:           VINNY GRACE   :            1985   (37 years)   Gender:         male   MR#:            [id number]   MPI#:   Patient Class:  Inpatient   EP with Ablation Report   Electrophysiologist:           Derick Gurrola MD   Indications   Ventricular tachycardia   Primary ICD-10 CM   I47.2 - Ventricular tachycardia   CPT Code:                  59623 - Ablation, VT/PVC     72246 - Ultrasound Guidance for vascular access     77425 - Intracardiac Ultrasound (Ice)     51604 - Transeptal Puncture of Intact Interatrial Septum     32719 - Programmed stimulation and pacing after intravenous drug  infusion    73115 -  Reina-procedural device evaluation and programming, ICD   Procedures Performed   Primary Procedures:      PVC/ VT Ablation (structural heart disease)   Conclusions   - Inducible VT with RBBB, right superior axis at 430 ms   - PVCs mapped to the anterolateral papillary muscle   - Large baso-lateral scar, previously noted   - Hemoginization of baso-lateral scar   - Anterolateral pap muscle PVC ablation   - Non-inducible post ablation on and off dobutamine   Follow-Up   - Bedrest for 2 hours   - Resume Amio load   Patient: VINNY GRACE           MRN: [id number]  Study Date: 2022   11:03 AM      Page 1 of 5  Exam Date: 2022, Exam No.: [id number]   - Stop Lidocaine   - Start Eliquis 5mb BID x30 days   - Can follow up with Dr Arteaga as outpatient   Complications   No complications         SECONDARY PROCEDURE  Procedure: Transthoracic echo  Findings and Treatment: PROCEDURE: Transthoracic echocardiogram with 2-D, M-Mode  and complete spectral and color flow Doppler.  INDICATION: Ventricular tachycardia (I47.2)  ------------------------------------------------------------------------  DIMENSIONS:  Dimensions:     Normal Values:  LA:     3.7 cm    2.0 - 4.0 cm  Ao:     3.3 cm    2.0 - 3.8 cm  SEPTUM: 1.1 cm    0.6 - 1.2 cm  PWT:    1.1 cm    0.6 - 1.1 cm  LVIDd:  4.5 cm    3.0 - 5.6 cm  LVIDs:  2.7 cm    1.8 - 4.0 cm  Derived Variables:  LVMI: 91 g/m2  RWT: 0.48  Fractional short: 40 %  ------------------------------------------------------------------------  OBSERVATIONS:  Aortic Root: Normal aortic root.  Aortic Valve: Normal trileaflet aortic valve.  Left Atrium: Normal left atrium.  LA volume index = 30  cc/m2.  Left Ventricle: Endocardium not well visualized; grossly at  least mild to moderate left ventricular dysfunction with  hypokinesis of the inferior and inferolateral walls  Pericardium/PleuraNormal pericardium with no pericardial  effusion.  Hemodynamic: Estimated right ventricular systolic pressure  equals 30 mm Hg, assuming right atrial pressure equals 10  mm Hg, consistent with normal pulmonary pressures.  ------------------------------------------------------------------------  CONCLUSIONS:  Very limited TTE  1. Endocardium not well visualized; grossly at least mild  to moderate left ventricular dysfunction with hypokinesis  of the inferior and inferolateral walls  ------------------------------------------------------------------------  Confirmed on  2022 - 18:40:33 by Genoveva Cast MD  ------------------------------------------------------------------------

## 2022-12-28 NOTE — DISCHARGE NOTE PROVIDER - NSDCMRMEDTOKEN_GEN_ALL_CORE_FT
aspirin 81 mg oral delayed release tablet: 1 tab(s) orally once a day  atorvastatin 10 mg oral tablet: 1 tab(s) orally once a day (at bedtime)  lisinopril 2.5 mg oral tablet: 1 tab(s) orally once a day  metoprolol tartrate 50 mg oral tablet: 1 tab(s) orally 2 times a day   aspirin 81 mg oral delayed release tablet: 1 tab(s) orally once a day  atorvastatin 10 mg oral tablet: 1 tab(s) orally once a day (at bedtime)  Eliquis 5 mg oral tablet: 1 tab(s) orally 2 times a day     **TEST SCRIPT. Do not fill**  lisinopril 2.5 mg oral tablet: 1 tab(s) orally once a day  metoprolol tartrate 50 mg oral tablet: 1 tab(s) orally 2 times a day   amiodarone 200 mg oral tablet: Complete amiodarone load by taking 2 tablets (400mg) every 8 hours (3 times per day) until January 3, 2022     Starting January 4th, take 1 tab orally once a day  aspirin 81 mg oral delayed release tablet: 1 tab(s) orally once a day  atorvastatin 10 mg oral tablet: 1 tab(s) orally once a day (at bedtime)  Eliquis 5 mg oral tablet: 1 tab(s) orally 2 times a day     hydrocortisone 1% topical cream: Apply topically to affected area 2 times a day, As Needed  metoprolol succinate 100 mg oral tablet, extended release: 1 tab(s) orally once a day

## 2022-12-28 NOTE — PROGRESS NOTE ADULT - SUBJECTIVE AND OBJECTIVE BOX
POST ANESTHESIA EVALUATION    37y Male POSTOP DAY 1 S/P VT ablation    MENTAL STATUS: Patient participation [ x ] Awake     [  ] Arousable     [  ] Sedated    AIRWAY PATENCY: [x  ] Satisfactory  [  ] Other:     Vital Signs Last 24 Hrs  T(C): 36.6 (28 Dec 2022 08:00), Max: 36.8 (27 Dec 2022 16:00)  T(F): 97.9 (28 Dec 2022 08:00), Max: 98.2 (27 Dec 2022 16:00)  HR: 59 (28 Dec 2022 07:00) (59 - 87)  BP: 95/61 (28 Dec 2022 07:00) (91/57 - 95/61)  BP(mean): 72 (28 Dec 2022 07:00) (68 - 72)  RR: 20 (28 Dec 2022 07:00) (9 - 20)  SpO2: 97% (28 Dec 2022 07:00) (96% - 99%)    Parameters below as of 28 Dec 2022 04:00  Patient On (Oxygen Delivery Method): room air            NAUSEA/ VOMITTING:  [x  ] NONE  [  ] CONTROLLED [  ] OTHER     PAIN: [ x ] CONTROLLED WITH CURRENT REGIMEN  [  ] OTHER    [x  ] NO APPARENT ANESTHESIA COMPLICATIONS      Comments:

## 2022-12-28 NOTE — PROGRESS NOTE ADULT - NS ATTEND AMEND GEN_ALL_CORE FT
38 yo M with a FH of premature SCD in mother (40s) & a PMH of NICM w/ reported normal cors at [facility] '15, VT, and s/p ablation/AICD [facility] '15 who p/w a cc of mult ICD shocks d/t PVC induced VF on 12/24/22. Initially amio loaded, now remains on Lido gtt & metoprolol 50 Q8H. Limited 2D TTE w/ mod LV sys dysfunction w/ inf & inferolateral wall hypokinesis. Neg C19 PCR 12/24/22. For RFA of PVCs/VT today.
s/p VT ablation yesterday. Doing well. No further episodes of VT. Rare PVCs on tele. Will continue with Diane Zhao for 30 days. Patient will follow up as outpatient with Dr Arteaga at [facility].

## 2022-12-28 NOTE — DISCHARGE NOTE PROVIDER - PROVIDER TOKENS
PROVIDER:[TOKEN:[[id number]],SCHEDULEDAPPT:[01/25/2023],SCHEDULEDAPPTTIME:[08:30 AM]] FREE:[LAST:[Syros],FIRST:[Alex],PHONE:[phone]],FAX:[(   )    -],ADDRESS:[[address] [place] [address]],SCHEDULEDAPPT:[01/09/2023],SCHEDULEDAPPTTIME:[12:30 PM],ESTABLISHEDPATIENT:[T]]

## 2022-12-28 NOTE — PROGRESS NOTE ADULT - ATTENDING COMMENTS
37 year old man with NICM with known VT and ICD with ablation in the past.  SP COVID booster 12/23  Developed VT/VT with 60 episodes and 20 shocks    For ablation today  Will discuss antiarrhythmic regimen with EP thereafter  DC central line
37 year old man with NICM with known VT and ICD with ablation in the past.  SP COVID booster 12/23  Developed VT/VT with 60 episodes and 20 shocks    sp ablation  Continue amio  DC planning for 12/29
Jennifer Dennis is a 37-year-old man with history of ventricular tachycardia (s/p ablation and AICD SJ  likely from ventricular scarring) in the setting of NICM. He presented with multiple episodes of AICD firing since 21:30 on 22.  He received lidocaine bolus followed by lidocaine gtt 2mcg/min in the ED.  Preliminary interrogation of ICD showed 60 VT/VF events with 58 ATP and 20 ICD shocks. Baseline ECG showed sinus tachycardia 105 with frequent PVCs After 2 additional ICD shocks in ED, he received additional lidocaine bolus with metoprolol 5mg IVP, amiodarone 300 mg IV bolus followed by Amiodarone gtt.  He received Fentanyl 50 mCg and magnesium 2mg.  The patient received Moderna COVID booster 22 with subsequent malaise, no fever, chills, cough, recent sick contact, syncope, SOB, BAXTER, N/V/D.  He underwent prior VT ablation with Dr. Alex DUONG and last saw him early this month.  He reported he had 1 previous firing 6-7 years ago with no subsequent firing until now. His mother  of cardiac arrest at young age. The patient’s initial labs noted mild leukocytosis with WBC 12.5K/uL, SCr 1.22 mg/dL., Na 134mmol/L, K+ 3.1 mg/dL, Lactate 3.1. (24 Dec 2022 23:09). Current findings are c/w VT storm with PVCs localized to left superior, responding to lidocaine and amiodarone. Findings were reviewed on rounds with Dr. Marisa Lewis, EP Cardiology. Current management includes tapering lidocaine gtt from 2 to 1 mg/hr and transitioning from IV to oral amiodarone load at 400 mg 3X daily for load of 10 g. TTE will be done Dr. Garcia will be contacted to discuss potential plan of repeat ablation. Given current diagnosis and family history, genetic testing for familial non-ischemic cardiomyopathy is planned
Jennifer Dennis is a 37-year-old man with history of ventricular tachycardia (s/p ablation and AICD SJ  likely from ventricular scarring) in the setting of NICM. He presented with multiple episodes of AICD firing since 21:30 on 22.  He received lidocaine bolus followed by lidocaine gtt 2mcg/min in the ED.  Preliminary interrogation of ICD showed 60 VT/VF events with 58 ATP and 20 ICD shocks. Baseline ECG showed sinus tachycardia 105 with frequent PVCs After 2 additional ICD shocks in ED, he received additional lidocaine bolus with metoprolol 5mg IVP, amiodarone 300 mg IV bolus followed by Amiodarone gtt.  He received Fentanyl 50 mCg and magnesium 2mg.  The patient received Moderna COVID booster 22 with subsequent malaise, no fever, chills, cough, recent sick contact, syncope, SOB, BAXTER, N/V/D.  He underwent prior VT ablation with Dr. Alex DUONG and last saw him early this month.  He reported he had 1 previous firing 6-7 years ago with no subsequent firing until now. His mother  of cardiac arrest at young age. The patient’s initial labs noted mild leukocytosis with WBC 12.5K/uL, SCr 1.22 mg/dL., Na 134mmol/L, K+ 3.1 mg/dL, Lactate 3.1. (24 Dec 2022 23:09). Current findings are c/w VT storm with PVCs localized to left superior, responding to lidocaine and amiodarone. Findings were reviewed on rounds with Dr. Marisa Lewis, EP Cardiology. Management 22 included tapering lidocaine gtt from 2 to 1 mg/hr and transitioning from IV to oral amiodarone load at 400 mg 3X daily for load of 10 g. TTE will be done Dr. Garcia will be contacted to discuss potential plan of repeat ablation. Given current diagnosis and family history, genetic testing for familial non-ischemic cardiomyopathy is planned.  Management 22 includes weaning Lidocaine off. Maintain metoprolol tartrate (Lopressor) 50 mg oral every 8 hrs. VT ablation now planned with Dr. LAURA Bullock for 22. Discontinue amiodarone (re bolus if recurrent VT). TTE 22 noted grossly at least mild to moderate left ventricular dysfunction with hypokinesis of the inferior and inferolateral walls.

## 2022-12-29 ENCOUNTER — TRANSCRIPTION ENCOUNTER (OUTPATIENT)
Age: 37
End: 2022-12-29

## 2022-12-29 VITALS — DIASTOLIC BLOOD PRESSURE: 65 MMHG | HEART RATE: 52 BPM | SYSTOLIC BLOOD PRESSURE: 102 MMHG | RESPIRATION RATE: 14 BRPM

## 2022-12-29 LAB
ANION GAP SERPL CALC-SCNC: 11 MMOL/L — SIGNIFICANT CHANGE UP (ref 7–14)
APTT BLD: 31.2 SEC — SIGNIFICANT CHANGE UP (ref 27–36.3)
BUN SERPL-MCNC: 14 MG/DL — SIGNIFICANT CHANGE UP (ref 7–23)
CALCIUM SERPL-MCNC: 9.3 MG/DL — SIGNIFICANT CHANGE UP (ref 8.4–10.5)
CHLORIDE SERPL-SCNC: 104 MMOL/L — SIGNIFICANT CHANGE UP (ref 98–107)
CO2 SERPL-SCNC: 26 MMOL/L — SIGNIFICANT CHANGE UP (ref 22–31)
CREAT SERPL-MCNC: 1.17 MG/DL — SIGNIFICANT CHANGE UP (ref 0.5–1.3)
EGFR: 83 ML/MIN/1.73M2 — SIGNIFICANT CHANGE UP
GLUCOSE SERPL-MCNC: 82 MG/DL — SIGNIFICANT CHANGE UP (ref 70–99)
HCT VFR BLD CALC: 41.5 % — SIGNIFICANT CHANGE UP (ref 39–50)
HGB BLD-MCNC: 13.4 G/DL — SIGNIFICANT CHANGE UP (ref 13–17)
INR BLD: 1.49 RATIO — HIGH (ref 0.88–1.16)
MAGNESIUM SERPL-MCNC: 2 MG/DL — SIGNIFICANT CHANGE UP (ref 1.6–2.6)
MCHC RBC-ENTMCNC: 26.8 PG — LOW (ref 27–34)
MCHC RBC-ENTMCNC: 32.3 GM/DL — SIGNIFICANT CHANGE UP (ref 32–36)
MCV RBC AUTO: 83 FL — SIGNIFICANT CHANGE UP (ref 80–100)
NRBC # BLD: 0 /100 WBCS — SIGNIFICANT CHANGE UP (ref 0–0)
NRBC # FLD: 0 K/UL — SIGNIFICANT CHANGE UP (ref 0–0)
PHOSPHATE SERPL-MCNC: 4.6 MG/DL — HIGH (ref 2.5–4.5)
PLATELET # BLD AUTO: 230 K/UL — SIGNIFICANT CHANGE UP (ref 150–400)
POTASSIUM SERPL-MCNC: 4.3 MMOL/L — SIGNIFICANT CHANGE UP (ref 3.5–5.3)
POTASSIUM SERPL-SCNC: 4.3 MMOL/L — SIGNIFICANT CHANGE UP (ref 3.5–5.3)
PROTHROM AB SERPL-ACNC: 17.4 SEC — HIGH (ref 10.5–13.4)
RBC # BLD: 5 M/UL — SIGNIFICANT CHANGE UP (ref 4.2–5.8)
RBC # FLD: 12.7 % — SIGNIFICANT CHANGE UP (ref 10.3–14.5)
SODIUM SERPL-SCNC: 141 MMOL/L — SIGNIFICANT CHANGE UP (ref 135–145)
WBC # BLD: 8.35 K/UL — SIGNIFICANT CHANGE UP (ref 3.8–10.5)
WBC # FLD AUTO: 8.35 K/UL — SIGNIFICANT CHANGE UP (ref 3.8–10.5)

## 2022-12-29 RX ORDER — AMIODARONE HYDROCHLORIDE 400 MG/1
1 TABLET ORAL
Qty: 60 | Refills: 0
Start: 2022-12-29 | End: 2023-02-26

## 2022-12-29 RX ORDER — HYDROCORTISONE 1 %
1 OINTMENT (GRAM) TOPICAL
Refills: 0 | Status: DISCONTINUED | OUTPATIENT
Start: 2022-12-29 | End: 2022-12-29

## 2022-12-29 RX ORDER — METOPROLOL TARTRATE 50 MG
1 TABLET ORAL
Qty: 30 | Refills: 0
Start: 2022-12-29 | End: 2023-01-27

## 2022-12-29 RX ORDER — HYDROCORTISONE 1 %
2 OINTMENT (GRAM) TOPICAL
Qty: 1 | Refills: 0
Start: 2022-12-29

## 2022-12-29 RX ORDER — METOPROLOL TARTRATE 50 MG
100 TABLET ORAL DAILY
Refills: 0 | Status: DISCONTINUED | OUTPATIENT
Start: 2022-12-29 | End: 2022-12-29

## 2022-12-29 RX ORDER — DIPHENHYDRAMINE HCL 50 MG
25 CAPSULE ORAL ONCE
Refills: 0 | Status: COMPLETED | OUTPATIENT
Start: 2022-12-29 | End: 2022-12-29

## 2022-12-29 RX ORDER — APIXABAN 2.5 MG/1
1 TABLET, FILM COATED ORAL
Qty: 60 | Refills: 0
Start: 2022-12-29 | End: 2023-01-27

## 2022-12-29 RX ORDER — AMIODARONE HYDROCHLORIDE 400 MG/1
1 TABLET ORAL
Qty: 0 | Refills: 0 | DISCHARGE

## 2022-12-29 RX ADMIN — CHLORHEXIDINE GLUCONATE 1 APPLICATION(S): 213 SOLUTION TOPICAL at 05:16

## 2022-12-29 RX ADMIN — AMIODARONE HYDROCHLORIDE 400 MILLIGRAM(S): 400 TABLET ORAL at 05:15

## 2022-12-29 RX ADMIN — APIXABAN 5 MILLIGRAM(S): 2.5 TABLET, FILM COATED ORAL at 10:16

## 2022-12-29 RX ADMIN — AMIODARONE HYDROCHLORIDE 400 MILLIGRAM(S): 400 TABLET ORAL at 13:11

## 2022-12-29 RX ADMIN — Medication 100 MILLIGRAM(S): at 10:15

## 2022-12-29 RX ADMIN — Medication 25 MILLIGRAM(S): at 10:15

## 2022-12-29 NOTE — PROGRESS NOTE ADULT - ASSESSMENT
37M PMHx: NICM, (s/p cath [facility] 2015, "normal cath"), Ventricular tachycardia (s/p ablation and AICD [facility] 2015 likely from ventricular scarring) presented from home for multiple episodes of AICD firing that began at 9:30pm while watching TV. In ED patient initially received Lidocaine bolus and Lidocaine gtt 2mg/hr.  Preliminary interrogation of ICD shows 60 VT/VF with 58 ATP and 20 ICD shocks. Rhythm VTach 200s with unsuccessful ATPs followed by shock.  ECG  with frequent PVCs.  Patient had 2 subsequent shocks in ED.  Additional lidocaine bolus given with Metoprolol 5mg IVP, Amiodarone 300mg and started on Amiodarone gtt.  Patient received Fentanyl 50mcg and Magnesium 2mg.  Patient reports he had a Moderna Covid Booster on 12/23/22 and woke up with malaise sleeping most of day.  He denies fever, chills, cough, recent sick contact, syncope, SOB, BAXTER, N/V/D.  Patient reports he is under the care of Dr. Arteaga at [facility] and last saw him early this month.  He report he had 1 previous firing 6-7 years ago with no subsequent firing until now.     Neuro:  - A&Ox3   - No issues     Resp:  - Satting well on RA    Cardiac:  #Ventricular tachycardia  - Moderna Covid Booster on 12/23/22  - limited TTE with mild to moderate LV dysfunction w/ hypokinesis of inf and inf.lateral walls  - Dec Lidocaine gtt to 0.5mg  - D'Dwayne lopressor 50mg TID, started Toprol XL 100qd   - Central line placed for IV Amio, can remove after ablation   - Maintain K >4 and Mag >2  - s/p ablation on 12/28   - amio load restarted after ablation   - started Eliquis 5 BID     GI:   - Regular      /RENAL:  - Nl Scr     ID  - Normal WBC   - RVP neg     ENDO  - no active issues     DVT Ppx: SubQ hep    37M PMHx: NICM, (s/p cath [facility] 2015, "normal cath"), Ventricular tachycardia (s/p ablation and AICD [facility] 2015 likely from ventricular scarring) presented from home for multiple episodes of AICD firing that began at 9:30pm while watching TV. In ED patient initially received Lidocaine bolus and Lidocaine gtt 2mg/hr.  Preliminary interrogation of ICD shows 60 VT/VF with 58 ATP and 20 ICD shocks. Rhythm VTach 200s with unsuccessful ATPs followed by shock.  ECG  with frequent PVCs.  Patient had 2 subsequent shocks in ED.  Additional lidocaine bolus given with Metoprolol 5mg IVP, Amiodarone 300mg and started on Amiodarone gtt.  Patient received Fentanyl 50mcg and Magnesium 2mg.  Patient reports he had a Moderna Covid Booster on 12/23/22 and woke up with malaise sleeping most of day.  He denies fever, chills, cough, recent sick contact, syncope, SOB, BAXTER, N/V/D.  Patient reports he is under the care of Dr. Arteaga at [place] and last saw him early this month.  He report he had 1 previous firing 6-7 years ago with no subsequent firing until now.     Neuro:  - A&Ox3   - No issues     Resp:  - Satting well on RA    Cardiac:  #Ventricular tachycardia  - Moderna Covid Booster on 12/23/22  - limited TTE with mild to moderate LV dysfunction w/ hypokinesis of inf and inf.lateral walls  - D'Dwayne lopressor 50mg TID, started Toprol XL 100qd   - Maintain K >4 and Mag >2  - s/p ablation on 12/28   - c/w ASA  - amio load restarted after ablation   - started Eliquis 5 BID     GI:   - Regular      /RENAL:  - Nl Scr     ID  - Normal WBC   - RVP neg     ENDO  - no active issues     DVT Ppx: SubQ hep    37M PMHx: NICM, (s/p cath [facility] 2015, "normal cath"), Ventricular tachycardia (s/p ablation and AICD [facility] 2015 likely from ventricular scarring) presented from home for multiple episodes of AICD firing that began at 9:30pm while watching TV. In ED patient initially received Lidocaine bolus and Lidocaine gtt 2mg/hr.  Preliminary interrogation of ICD shows 60 VT/VF with 58 ATP and 20 ICD shocks. Rhythm VTach 200s with unsuccessful ATPs followed by shock.  ECG  with frequent PVCs.  Patient had 2 subsequent shocks in ED.  Additional lidocaine bolus given with Metoprolol 5mg IVP, Amiodarone 300mg and started on Amiodarone gtt.  Patient received Fentanyl 50mcg and Magnesium 2mg.  Patient reports he had a Moderna Covid Booster on 12/23/22 and woke up with malaise sleeping most of day.  He denies fever, chills, cough, recent sick contact, syncope, SOB, BAXTER, N/V/D.  Patient reports he is under the care of Dr. Arteaga at [place] and last saw him early this month.  He report he had 1 previous firing 6-7 years ago with no subsequent firing until now.     Neuro:  - A&Ox3   - No issues     Resp:  - Satting well on RA    Cardiac:  #Ventricular tachycardia  - Moderna Covid Booster on 12/23/22  - limited TTE with mild to moderate LV dysfunction w/ hypokinesis of inf and inf.lateral walls  - D'Dwayne lopressor 50mg TID, started Toprol XL 100qd   - Maintain K >4 and Mag >2  - s/p ablation on 12/28   - c/w ASA  - amio load restarted after ablation   - started Eliquis 5 BID     GI:   - Regular      /RENAL:  - Nl Scr     ID  - Normal WBC   - RVP neg     ENDO  - no active issues     DERM   - Pt with new L>R UE rash that extends to armits  - may be contact dermatitis 2/2 BP cuff or ?amio rash  - s/p 25 PO benadryl,  will assess for improvement      DVT Ppx: SubQ hep

## 2022-12-29 NOTE — PROGRESS NOTE ADULT - SUBJECTIVE AND OBJECTIVE BOX
Patient feeling well. Ambulating without difficulty.   Denies chest pain, groin pain, palpitations, shortness of breath, dizziness. Mild headache earlier which has resolved.     Vital Signs Last 24 Hrs  T(C): 36.7 (29 Dec 2022 12:00), Max: 36.8 (28 Dec 2022 16:00)  T(F): 98.1 (29 Dec 2022 12:00), Max: 98.2 (28 Dec 2022 16:00)  HR: 52 (29 Dec 2022 13:00) (49 - 64)  BP: 102/65 (29 Dec 2022 13:00) (92/56 - 126/90)  BP(mean): 72 (29 Dec 2022 13:00) (68 - 102)  RR: 14 (29 Dec 2022 13:00) (14 - 26)  SpO2: 98% (29 Dec 2022 09:00) (96% - 100%)    Parameters below as of 29 Dec 2022 07:00  Patient On (Oxygen Delivery Method): room air      Telemetry: Normal sinus rhythm with occasional polymorphic PVC's/couplets. 3 beats NSVT.   MEDICATIONS  (STANDING):  aMIOdarone    Tablet   Oral   aMIOdarone    Tablet 400 milliGRAM(s) Oral every 8 hours  apixaban 5 milliGRAM(s) Oral every 12 hours  aspirin enteric coated 81 milliGRAM(s) Oral daily  atorvastatin 10 milliGRAM(s) Oral at bedtime  chlorhexidine 2% Cloths 1 Application(s) Topical <User Schedule>  hydrocortisone 1% Cream 1 Application(s) Topical two times a day  melatonin 3 milliGRAM(s) Oral at bedtime  metoprolol succinate  milliGRAM(s) Oral daily    MEDICATIONS  (PRN):  acetaminophen     Tablet .. 650 milliGRAM(s) Oral every 6 hours PRN Temp greater or equal to 38C (100.4F), Mild Pain (1 - 3)  benzonatate 100 milliGRAM(s) Oral three times a day PRN Cough/ sore throat  sodium chloride 0.9% lock flush 10 milliLiter(s) IV Push every 1 hour PRN Pre/post blood products, medications, blood draw, and to maintain line patency          Physical exam:   Gen- well developed well nourished NAD  Resp-  clear to auscultation. No wheezing, rales or rhonchi  CV- S1 and S2 RRR. No murmurs, gallops or rubs  ABD- soft nontender + bowel sounds  EXT- bilateral upper arm erythema warm to touch. Nontender. No lower extremity edema. Right groin without bleeding, ecchymosis or hematoma  Neuro- grossly nonfocal.                            13.4   8.35  )-----------( 230      ( 29 Dec 2022 03:22 )             41.5     PT/INR - ( 29 Dec 2022 03:22 )   PT: 17.4 sec;   INR: 1.49 ratio         PTT - ( 29 Dec 2022 03:22 )  PTT:31.2 sec  12-29    141  |  104  |  14  ----------------------------<  82  4.3   |  26  |  1.17    Ca    9.3      29 Dec 2022 03:22  Phos  4.6     12-29  Mg     2.00     12-29

## 2022-12-29 NOTE — PROGRESS NOTE ADULT - PROVIDER SPECIALTY LIST ADULT
Anesthesia
CCU
Electrophysiology
CCU
Electrophysiology
CCU

## 2022-12-29 NOTE — PROGRESS NOTE ADULT - ASSESSMENT
36 yo male with past medical history of DCM (normal cath at [facility] 2015), ventricular tachycardia s/p ablation and ICD implant 2015 who presented from home after receiving multiple ICD shocks while watching television. Device interrogation showed 60 VT/VF episodes resulting in ATP 58 times and 20 ICD shocks. Patient underwent successful VT ablation 12/27/23. Post procedure ICD interrogation showed RV sensing, threshold and impedances unchanged.   Now on 10 gram Amiodarone load. Telemetry: NSR with multifocal PVC's. 3 beats NSVT overnight.  On Eliquis 5mg bid x 30 days.   Post procedure ablation teaching done with patient and his family.  Amiodarone teaching done with the patient. Written information provided.   Written instructions and contact information provided.   All questions answered.   Labs reviewed. K+ 4.3  Mg +2.0  Keep K+ > 4.0 and Mg > 2.0.   Anticipate discharge later today if arm erythema resolves.   He will f/up with Dr. Arteaga (EP at [facility]) and not Dr. Gurrola. Patient will make the appointment upon discharge.

## 2022-12-29 NOTE — DISCHARGE NOTE NURSING/CASE MANAGEMENT/SOCIAL WORK - NSDCPEFALRISK_GEN_ALL_CORE
For information on Fall & Injury Prevention, visit: [email or web address] OR  [email or web address] OR  https://www.cdc.gov/steadi/patient.html

## 2022-12-29 NOTE — DISCHARGE NOTE NURSING/CASE MANAGEMENT/SOCIAL WORK - PATIENT PORTAL LINK FT
You can access the FollowMyHealth Patient Portal offered by [facility] by registering at the following website: [email or web address]. By joining [facility]’s FollowMyHealth portal, you will also be able to view your health information using other applications (apps) compatible with our system.

## 2022-12-29 NOTE — PROGRESS NOTE ADULT - SUBJECTIVE AND OBJECTIVE BOX
PROGRESS NOTE:   Authored by Maggie Prajapati DO , PGY1     Patient is a 37y old  Male who presents with a chief complaint of ICD firing (28 Dec 2022 11:53)      SUBJECTIVE / OVERNIGHT EVENTS:    All other review of systems is negative unless indicated above.    MEDICATIONS  (STANDING):  aMIOdarone    Tablet   Oral   aMIOdarone    Tablet 400 milliGRAM(s) Oral every 8 hours  apixaban 5 milliGRAM(s) Oral every 12 hours  aspirin enteric coated 81 milliGRAM(s) Oral daily  atorvastatin 10 milliGRAM(s) Oral at bedtime  chlorhexidine 2% Cloths 1 Application(s) Topical <User Schedule>  melatonin 3 milliGRAM(s) Oral at bedtime  metoprolol succinate  milliGRAM(s) Oral daily    MEDICATIONS  (PRN):  acetaminophen     Tablet .. 650 milliGRAM(s) Oral every 6 hours PRN Temp greater or equal to 38C (100.4F), Mild Pain (1 - 3)  benzonatate 100 milliGRAM(s) Oral three times a day PRN Cough/ sore throat  sodium chloride 0.9% lock flush 10 milliLiter(s) IV Push every 1 hour PRN Pre/post blood products, medications, blood draw, and to maintain line patency      CAPILLARY BLOOD GLUCOSE        I&O's Summary    28 Dec 2022 07:01  -  29 Dec 2022 07:00  --------------------------------------------------------  IN: 1535 mL / OUT: 3600 mL / NET: -2065 mL        PHYSICAL EXAM:  Vital Signs Last 24 Hrs  T(C): 36.7 (29 Dec 2022 04:00), Max: 36.8 (28 Dec 2022 12:00)  T(F): 98.1 (29 Dec 2022 04:00), Max: 98.2 (28 Dec 2022 12:00)  HR: 50 (29 Dec 2022 07:00) (49 - 69)  BP: 126/90 (29 Dec 2022 07:00) (92/56 - 126/90)  BP(mean): 102 (29 Dec 2022 07:00) (68 - 102)  RR: 20 (29 Dec 2022 07:00) (14 - 26)  SpO2: 97% (29 Dec 2022 07:00) (94% - 100%)    Parameters below as of 29 Dec 2022 07:00  Patient On (Oxygen Delivery Method): room air        GENERAL: No acute distress, well-developed  HEAD:  Atraumatic, Normocephalic  EYES: EOMI, PERRLA, conjunctiva and sclera clear  NECK: Supple, no lymphadenopathy, no JVD  CHEST/LUNG: CTAB; No wheezes, rales, or rhonchi  HEART: Regular rate and rhythm; No murmurs, rubs, or gallops  ABDOMEN: Soft, non-tender, non-distended; normal bowel sounds, no organomegaly  EXTREMITIES:  2+ peripheral pulses b/l, No clubbing, cyanosis, or edema  NEUROLOGY: A&O x 3, no focal deficits  SKIN: No rashes or lesions    LABS:                        13.4   8.35  )-----------( 230      ( 29 Dec 2022 03:22 )             41.5     12-29    141  |  104  |  14  ----------------------------<  82  4.3   |  26  |  1.17    Ca    9.3      29 Dec 2022 03:22  Phos  4.6     12-29  Mg     2.00     12-29      PT/INR - ( 29 Dec 2022 03:22 )   PT: 17.4 sec;   INR: 1.49 ratio         PTT - ( 29 Dec 2022 03:22 )  PTT:31.2 sec            RADIOLOGY & ADDITIONAL TESTS:  Results Reviewed:   Imaging Personally Reviewed:  Electrocardiogram Personally Reviewed:    COORDINATION OF CARE:  Care Discussed with Consultants/Other Providers [Y/N]:  Prior or Outpatient Records Reviewed [Y/N]:   PROGRESS NOTE:   Authored by Maggie Prajapati DO , PGY1     Patient is a 37y old  Male who presents with a chief complaint of ICD firing (28 Dec 2022 11:53)      SUBJECTIVE / OVERNIGHT EVENTS:  Pt seen and examined at bedside this AM. He c/o occasional light headedness and fatigue, and had 2-3 episodes of pressure like chest pain that has now resolved. Pt was noted to develop a L>R upper extremity confluent and erythematous rash overnight. He endorses occasional itchiness but denies any burning or pain in the location of the rash.    All other review of systems is negative unless indicated above.    MEDICATIONS  (STANDING):  aMIOdarone    Tablet   Oral   aMIOdarone    Tablet 400 milliGRAM(s) Oral every 8 hours  apixaban 5 milliGRAM(s) Oral every 12 hours  aspirin enteric coated 81 milliGRAM(s) Oral daily  atorvastatin 10 milliGRAM(s) Oral at bedtime  chlorhexidine 2% Cloths 1 Application(s) Topical <User Schedule>  melatonin 3 milliGRAM(s) Oral at bedtime  metoprolol succinate  milliGRAM(s) Oral daily    MEDICATIONS  (PRN):  acetaminophen     Tablet .. 650 milliGRAM(s) Oral every 6 hours PRN Temp greater or equal to 38C (100.4F), Mild Pain (1 - 3)  benzonatate 100 milliGRAM(s) Oral three times a day PRN Cough/ sore throat  sodium chloride 0.9% lock flush 10 milliLiter(s) IV Push every 1 hour PRN Pre/post blood products, medications, blood draw, and to maintain line patency      CAPILLARY BLOOD GLUCOSE        I&O's Summary    28 Dec 2022 07:01  -  29 Dec 2022 07:00  --------------------------------------------------------  IN: 1535 mL / OUT: 3600 mL / NET: -2065 mL        PHYSICAL EXAM:  Vital Signs Last 24 Hrs  T(C): 36.7 (29 Dec 2022 04:00), Max: 36.8 (28 Dec 2022 12:00)  T(F): 98.1 (29 Dec 2022 04:00), Max: 98.2 (28 Dec 2022 12:00)  HR: 50 (29 Dec 2022 07:00) (49 - 69)  BP: 126/90 (29 Dec 2022 07:00) (92/56 - 126/90)  BP(mean): 102 (29 Dec 2022 07:00) (68 - 102)  RR: 20 (29 Dec 2022 07:00) (14 - 26)  SpO2: 97% (29 Dec 2022 07:00) (94% - 100%)    Parameters below as of 29 Dec 2022 07:00  Patient On (Oxygen Delivery Method): room air        GENERAL: No acute distress, well-developed  HEAD:  Atraumatic, Normocephalic  EYES: EOMI, PERRLA, conjunctiva and sclera clear  NECK: Supple, no lymphadenopathy, no JVD  CHEST/LUNG: CTAB; No wheezes, rales, or rhonchi  HEART: Regular rate and rhythm; No murmurs, rubs, or gallops  ABDOMEN: Soft, non-tender, non-distended; normal bowel sounds, no organomegaly  EXTREMITIES:  2+ peripheral pulses b/l, No clubbing, cyanosis, or edema. See rash section below   NEUROLOGY: A&O x 3, no focal deficits  SKIN: Pt with erythematous, confluent rash in the L>R upper extremity that extends to the armpits     LABS:                        13.4   8.35  )-----------( 230      ( 29 Dec 2022 03:22 )             41.5     12-29    141  |  104  |  14  ----------------------------<  82  4.3   |  26  |  1.17    Ca    9.3      29 Dec 2022 03:22  Phos  4.6     12-29  Mg     2.00     12-29      PT/INR - ( 29 Dec 2022 03:22 )   PT: 17.4 sec;   INR: 1.49 ratio         PTT - ( 29 Dec 2022 03:22 )  PTT:31.2 sec            RADIOLOGY & ADDITIONAL TESTS:  Results Reviewed:   Imaging Personally Reviewed:  Electrocardiogram Personally Reviewed:    COORDINATION OF CARE:  Care Discussed with Consultants/Other Providers [Y/N]:  Prior or Outpatient Records Reviewed [Y/N]:

## 2022-12-30 LAB
CULTURE RESULTS: SIGNIFICANT CHANGE UP
SPECIMEN SOURCE: SIGNIFICANT CHANGE UP

## 2023-01-25 ENCOUNTER — APPOINTMENT (OUTPATIENT)
Dept: ELECTROPHYSIOLOGY | Facility: CLINIC | Age: 38
End: 2023-01-25

## 2023-03-30 NOTE — ED PROVIDER NOTE - WR ORDER STATUS 1
Resulted W Plasty Text: The lesion was extirpated to the level of the fat with a #15 scalpel blade.  Given the location of the defect, shape of the defect and the proximity to free margins a W-plasty was deemed most appropriate for repair.  Using a sterile surgical marker, the appropriate transposition arms of the W-plasty were drawn incorporating the defect and placing the expected incisions within the relaxed skin tension lines where possible.    The area thus outlined was incised deep to adipose tissue with a #15 scalpel blade.  The skin margins were undermined to an appropriate distance in all directions utilizing iris scissors.  The opposing transposition arms were then transposed into place in opposite direction and anchored with interrupted buried subcutaneous sutures.

## 2023-06-12 ENCOUNTER — EMERGENCY (EMERGENCY)
Facility: HOSPITAL | Age: 38
LOS: 1 days | Discharge: HOME | End: 2023-06-12
Attending: EMERGENCY MEDICINE | Admitting: EMERGENCY MEDICINE
Payer: MEDICAID

## 2023-06-12 VITALS
TEMPERATURE: 98 F | RESPIRATION RATE: 18 BRPM | DIASTOLIC BLOOD PRESSURE: 71 MMHG | OXYGEN SATURATION: 100 % | SYSTOLIC BLOOD PRESSURE: 104 MMHG | HEART RATE: 60 BPM

## 2023-06-12 DIAGNOSIS — Z95.810 PRESENCE OF AUTOMATIC (IMPLANTABLE) CARDIAC DEFIBRILLATOR: Chronic | ICD-10-CM

## 2023-06-12 DIAGNOSIS — Z95.0 PRESENCE OF CARDIAC PACEMAKER: Chronic | ICD-10-CM

## 2023-06-12 LAB
BASOPHILS # BLD AUTO: 0.03 K/UL — SIGNIFICANT CHANGE UP (ref 0–0.2)
BASOPHILS NFR BLD AUTO: 0.2 % — SIGNIFICANT CHANGE UP (ref 0–2)
EOSINOPHIL # BLD AUTO: 0.02 K/UL — SIGNIFICANT CHANGE UP (ref 0–0.5)
EOSINOPHIL NFR BLD AUTO: 0.2 % — SIGNIFICANT CHANGE UP (ref 0–6)
HCT VFR BLD CALC: 44.9 % — SIGNIFICANT CHANGE UP (ref 39–50)
HGB BLD-MCNC: 15.2 G/DL — SIGNIFICANT CHANGE UP (ref 13–17)
IANC: 11.21 K/UL — HIGH (ref 1.8–7.4)
IMM GRANULOCYTES NFR BLD AUTO: 0.4 % — SIGNIFICANT CHANGE UP (ref 0–0.9)
LYMPHOCYTES # BLD AUTO: 1.11 K/UL — SIGNIFICANT CHANGE UP (ref 1–3.3)
LYMPHOCYTES # BLD AUTO: 8.7 % — LOW (ref 13–44)
MCHC RBC-ENTMCNC: 27.6 PG — SIGNIFICANT CHANGE UP (ref 27–34)
MCHC RBC-ENTMCNC: 33.9 GM/DL — SIGNIFICANT CHANGE UP (ref 32–36)
MCV RBC AUTO: 81.5 FL — SIGNIFICANT CHANGE UP (ref 80–100)
MONOCYTES # BLD AUTO: 0.39 K/UL — SIGNIFICANT CHANGE UP (ref 0–0.9)
MONOCYTES NFR BLD AUTO: 3 % — SIGNIFICANT CHANGE UP (ref 2–14)
NEUTROPHILS # BLD AUTO: 11.21 K/UL — HIGH (ref 1.8–7.4)
NEUTROPHILS NFR BLD AUTO: 87.5 % — HIGH (ref 43–77)
NRBC # BLD: 0 /100 WBCS — SIGNIFICANT CHANGE UP (ref 0–0)
NRBC # FLD: 0 K/UL — SIGNIFICANT CHANGE UP (ref 0–0)
PLATELET # BLD AUTO: 222 K/UL — SIGNIFICANT CHANGE UP (ref 150–400)
RBC # BLD: 5.51 M/UL — SIGNIFICANT CHANGE UP (ref 4.2–5.8)
RBC # FLD: 11.9 % — SIGNIFICANT CHANGE UP (ref 10.3–14.5)
WBC # BLD: 12.81 K/UL — HIGH (ref 3.8–10.5)
WBC # FLD AUTO: 12.81 K/UL — HIGH (ref 3.8–10.5)

## 2023-06-12 PROCEDURE — 93010 ELECTROCARDIOGRAM REPORT: CPT

## 2023-06-12 PROCEDURE — 99285 EMERGENCY DEPT VISIT HI MDM: CPT

## 2023-06-12 RX ORDER — SODIUM CHLORIDE 9 MG/ML
1000 INJECTION, SOLUTION INTRAVENOUS ONCE
Refills: 0 | Status: COMPLETED | OUTPATIENT
Start: 2023-06-12 | End: 2023-06-12

## 2023-06-12 RX ORDER — MECLIZINE HCL 12.5 MG
25 TABLET ORAL ONCE
Refills: 0 | Status: COMPLETED | OUTPATIENT
Start: 2023-06-12 | End: 2023-06-12

## 2023-06-12 RX ADMIN — Medication 25 MILLIGRAM(S): at 23:26

## 2023-06-12 RX ADMIN — SODIUM CHLORIDE 1000 MILLILITER(S): 9 INJECTION, SOLUTION INTRAVENOUS at 23:25

## 2023-06-12 NOTE — ED PROVIDER NOTE - CLINICAL SUMMARY MEDICAL DECISION MAKING FREE TEXT BOX
37M p/w dizziness and vomiting earlier this evening.  Started spontaneously, was laying on the couch then felt the room spinning, it was worse with standing, and when he started to walk felt off balance.  [place] like he was bumping into the wall.  No diarrhea.  USOH prior to event. PMHX cardiomyopathy, v-tach, HLD.  PSHX AICD ([facility]), V-tach ablation (earlier this year).  No T.  NKDA.  EKG SR at 62 no zainab no std.  IVCD with mild strain.   qtc 485.  No PVCs.  No elicitable nystagmus.  FTN intact,  Heel-Shin intact.  No motor weakness.  Gait mildly unsteady.  Likely peripheral vertigo however given h/o cardiomyopathy and Vtach will ask cards to interrogate AICD (low suspicion for arrhythmia), rx meclizine.  Would hold off zofran or reglan given qtc 485 and h/o vtach.  Check CTA given mild imbalance while walking.  Rx fluids as appears dry and was vomiting.  Reass.  If feeling better with meclizine would d/c home f/u neuro as outpt.

## 2023-06-12 NOTE — CHART NOTE - NSCHARTNOTEFT_GEN_A_CORE
Called by ER for PPM limited interrogation.                                                                                         Date/Time: 6/12/2023--23:30  : St patt                                                                                --Quick Look Report:  NO EVENTS    Therapy Summary:                          VT/VF        AT/AF  Pace-Terminated Episodes:                0               0  Shock-Terminated Episodes:              0               0  Total Shocks:                                    0               0           Aborted Shocks:                               0               0      Recommend full interrogation in AM if warranted.

## 2023-06-12 NOTE — ED PROVIDER NOTE - ADDITIONAL NOTES AND INSTRUCTIONS:
To Whom it May Concern - Patient seen and evaluated in Emergency Room. Please excuse the above individual for medical care and recovery until date above. Thank you for you care. - Ingrid Rollins MD.

## 2023-06-12 NOTE — ED ADULT TRIAGE NOTE - CHIEF COMPLAINT QUOTE
Patient c/o dizziness and vomiting beginning 30 minutes PTA. Endorses mild abdominal pain. Denies syncope, chest pain and SOB. Hx. cardiomyopathy and ablation.

## 2023-06-12 NOTE — ED PROVIDER NOTE - ATTENDING CONTRIBUTION TO CARE
37M p/w dizziness and vomiting earlier this evening.  Started spontaneously, was laying on the couch then felt the room spinning, it was worse with standing, and when he started to walk felt off balance.  [place] like he was bumping into the wall.  No diarrhea.  USOH prior to event. PMHX cardiomyopathy, v-tach, HLD.  PSHX AICD ([facility]), V-tach ablation (earlier this year).  No T.  NKDA.  EKG SR at 62 no zainab no std.  IVCD with mild strain.   qtc 485.  No PVCs.  No elicitable nystagmus.  FTN intact,  Heel-Shin intact.  No motor weakness.  Gait mildly unsteady.  Likely peripheral vertigo however given h/o cardiomyopathy and Vtach will ask cards to interrogate AICD (low suspicion for arrhythmia), rx meclizine.  Would hold off zofran or reglan given qtc 485 and h/o vtach.  Check CTA given mild imbalance while walking.  Rx fluids as appears dry and was vomiting.  Reass.  If feeling better with meclizine would d/c home f/u neuro as outpt.   VS:  unremarkable    GEN - NAD;  malaise;   A+O x3   HEAD - NC/AT     ENT - PEERL, EOMI, mucous membranes    moist , no discharge.  No nystagmus.   NECK: Neck supple, non-tender without lymphadenopathy, no masses, no JVD  PULM - CTA b/l,  symmetric breath sounds  COR -  normal heart sounds    ABD - , ND, NT, soft,  BACK - no CVA tenderness, nontender spine     EXTREMS - no edema, no deformity, warm and well perfused    SKIN - no rash    or bruising      NEUROLOGIC - alert, face symmetric, speech fluent, sensation nl, motor no focal deficit.  FTN intact.  Heel-shin intact.  Gait mild imbalance.

## 2023-06-12 NOTE — ED ADULT NURSE NOTE - NSFALLUNIVINTERV_ED_ALL_ED
Bed/Stretcher in lowest position, wheels locked, appropriate side rails in place/Call bell, personal items and telephone in reach/Instruct patient to call for assistance before getting out of bed/chair/stretcher/Non-slip footwear applied when patient is off stretcher/[place] to call system/Physically safe environment - no spills, clutter or unnecessary equipment/Purposeful proactive rounding/Room/bathroom lighting operational, light cord in reach

## 2023-06-12 NOTE — ED PROVIDER NOTE - PHYSICAL EXAMINATION
VS:  unremarkable    GEN - NAD;  malaise;   A+O x3   HEAD - NC/AT     ENT - PEERL, EOMI, mucous membranes    moist , no discharge.  No nystagmus.   NECK: Neck supple, non-tender without lymphadenopathy, no masses, no JVD  PULM - CTA b/l,  symmetric breath sounds  COR -  normal heart sounds    ABD - , ND, NT, soft,  BACK - no CVA tenderness, nontender spine     EXTREMS - no edema, no deformity, warm and well perfused    SKIN - no rash    or bruising      NEUROLOGIC - alert, face symmetric, speech fluent, sensation nl, motor no focal deficit.  FTN intact.  Heel-shin intact.  Gait mild imbalance.

## 2023-06-12 NOTE — ED PROVIDER NOTE - NSFOLLOWUPCLINICS_GEN_ALL_ED_FT
[facility]  Neurology  [address] [place] [address] [address] [place] [address]  Phone: [phone]  Fax:

## 2023-06-12 NOTE — ED PROVIDER NOTE - PATIENT PORTAL LINK FT
You can access the FollowMyHealth Patient Portal offered by [facility] by registering at the following website: [email or web address]. By joining [facility]’s FollowMyHealth portal, you will also be able to view your health information using other applications (apps) compatible with our system.

## 2023-06-12 NOTE — ED PROVIDER NOTE - OBJECTIVE STATEMENT
37M p/w dizziness and vomiting earlier this evening.  Started spontaneously, was laying on the couch then felt the room spinning, it was worse with standing, and when he started to walk felt off balance.  [place] like he was bumping into the wall.  No diarrhea.  USOH prior to event. PMHX cardiomyopathy, v-tach, HLD.  PSHX AICD ([facility]), V-tach ablation (earlier this year).  No T.  NKDA.  EKG SR at 62 no zainab no std.  IVCD with mild strain.   qtc 485.  No PVCs.  No elicitable nystagmus.  FTN intact,  Heel-Shin intact.  No motor weakness.  Gait mildly unsteady.  Likely peripheral vertigo however given h/o cardiomyopathy and Vtach will ask cards to interrogate AICD (low suspicion for arrhythmia), rx meclizine.  Would hold off zofran or reglan given qtc 485 and h/o vtach.  Check CTA given mild imbalance while walking.  Rx fluids as appears dry and was vomiting.  Reass.  If feeling better with meclizine would d/c home f/u neuro as outpt.   VS:  unremarkable    GEN - NAD;  malaise;   A+O x3   HEAD - NC/AT     ENT - PEERL, EOMI, mucous membranes    moist , no discharge.  No nystagmus.   NECK: Neck supple, non-tender without lymphadenopathy, no masses, no JVD  PULM - CTA b/l,  symmetric breath sounds  COR -  normal heart sounds    ABD - , ND, NT, soft,  BACK - no CVA tenderness, nontender spine     EXTREMS - no edema, no deformity, warm and well perfused    SKIN - no rash    or bruising      NEUROLOGIC - alert, face symmetric, speech fluent, sensation nl, motor no focal deficit.  FTN intact.  Heel-shin intact.  Gait mild imbalance.

## 2023-06-12 NOTE — ED PROVIDER NOTE - NSFOLLOWUPINSTRUCTIONS_ED_ALL_ED_FT
Dizziness - Vertigo    Dizziness can manifest as a feeling of unsteadiness or light-headedness. You may feel like you are about to faint. This condition can be caused by a number of things, including medicines, dehydration, or illness. Drink enough fluid to keep your urine clear or pale yellow. Do not drink alcohol and limit your caffeine intake. Avoid quick or sudden movements.  Rise slowly from chairs and steady yourself until you feel okay. In the morning, first sit up on the side of the bed.    - Can take meclizine 25mg every 8 hours as needed for dizziness  - Follow-up with Neurology on discharge.     SEEK IMMEDIATE MEDICAL CARE IF YOU HAVE ANY OF THE FOLLOWING SYMPTOMS: vomiting, changes in your vision or speech, weakness in your arms or legs, trouble speaking or swallowing, chest pain, abdominal pain, shortness of breath, sweating, bleeding, headache, neck pain, or fever.

## 2023-06-12 NOTE — ED PROVIDER NOTE - PROGRESS NOTE DETAILS
Ayo PGY2: Patient endorsed to me at sign out. Seen and assessed at bedside. Pt feels improved, no longer dizzy. Ambulatory now w/o difficulty, able to turn head and body w/o sxs. No events on EP eval. DC home with meclizine and neuro f/u

## 2023-06-13 VITALS
DIASTOLIC BLOOD PRESSURE: 74 MMHG | TEMPERATURE: 98 F | SYSTOLIC BLOOD PRESSURE: 110 MMHG | RESPIRATION RATE: 16 BRPM | OXYGEN SATURATION: 100 % | HEART RATE: 54 BPM

## 2023-06-13 LAB
ALBUMIN SERPL ELPH-MCNC: 4.4 G/DL — SIGNIFICANT CHANGE UP (ref 3.3–5)
ALP SERPL-CCNC: 87 U/L — SIGNIFICANT CHANGE UP (ref 40–120)
ALT FLD-CCNC: 53 U/L — HIGH (ref 4–41)
ANION GAP SERPL CALC-SCNC: 14 MMOL/L — SIGNIFICANT CHANGE UP (ref 7–14)
AST SERPL-CCNC: 28 U/L — SIGNIFICANT CHANGE UP (ref 4–40)
BILIRUB SERPL-MCNC: 0.3 MG/DL — SIGNIFICANT CHANGE UP (ref 0.2–1.2)
BUN SERPL-MCNC: 19 MG/DL — SIGNIFICANT CHANGE UP (ref 7–23)
CALCIUM SERPL-MCNC: 9.2 MG/DL — SIGNIFICANT CHANGE UP (ref 8.4–10.5)
CHLORIDE SERPL-SCNC: 101 MMOL/L — SIGNIFICANT CHANGE UP (ref 98–107)
CO2 SERPL-SCNC: 22 MMOL/L — SIGNIFICANT CHANGE UP (ref 22–31)
CREAT SERPL-MCNC: 1.12 MG/DL — SIGNIFICANT CHANGE UP (ref 0.5–1.3)
EGFR: 87 ML/MIN/1.73M2 — SIGNIFICANT CHANGE UP
GLUCOSE SERPL-MCNC: 113 MG/DL — HIGH (ref 70–99)
POTASSIUM SERPL-MCNC: 4.3 MMOL/L — SIGNIFICANT CHANGE UP (ref 3.5–5.3)
POTASSIUM SERPL-SCNC: 4.3 MMOL/L — SIGNIFICANT CHANGE UP (ref 3.5–5.3)
PROT SERPL-MCNC: 7 G/DL — SIGNIFICANT CHANGE UP (ref 6–8.3)
SODIUM SERPL-SCNC: 137 MMOL/L — SIGNIFICANT CHANGE UP (ref 135–145)
TROPONIN T, HIGH SENSITIVITY RESULT: 15 NG/L — SIGNIFICANT CHANGE UP
TROPONIN T, HIGH SENSITIVITY RESULT: 19 NG/L — SIGNIFICANT CHANGE UP

## 2023-06-13 PROCEDURE — 70498 CT ANGIOGRAPHY NECK: CPT | Mod: 26,MA

## 2023-06-13 PROCEDURE — 70496 CT ANGIOGRAPHY HEAD: CPT | Mod: 26,MA

## 2023-06-13 RX ORDER — MECLIZINE HCL 12.5 MG
1 TABLET ORAL
Qty: 21 | Refills: 0
Start: 2023-06-13 | End: 2023-06-19

## 2023-09-15 ENCOUNTER — OFFICE (OUTPATIENT)
Dept: URBAN - METROPOLITAN AREA CLINIC 90 | Facility: CLINIC | Age: 38
Setting detail: OPHTHALMOLOGY
End: 2023-09-15
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
## 2023-09-15 PROCEDURE — NO SHOW FE NO SHOW FEE: Performed by: OPHTHALMOLOGY

## 2023-10-19 ENCOUNTER — OFFICE (OUTPATIENT)
Dept: URBAN - METROPOLITAN AREA CLINIC 90 | Facility: CLINIC | Age: 38
Setting detail: OPHTHALMOLOGY
End: 2023-10-19
Payer: COMMERCIAL

## 2023-10-19 DIAGNOSIS — H35.40: ICD-10-CM

## 2023-10-19 DIAGNOSIS — H43.393: ICD-10-CM

## 2023-10-19 DIAGNOSIS — H11.132: ICD-10-CM

## 2023-10-19 PROCEDURE — 92014 COMPRE OPH EXAM EST PT 1/>: CPT | Performed by: OPHTHALMOLOGY

## 2023-10-19 ASSESSMENT — REFRACTION_CURRENTRX
OS_VPRISM_DIRECTION: SV
OD_SPHERE: -5.50
OS_OVR_VA: 20/
OS_SPHERE: -6.50
OD_VPRISM_DIRECTION: SV
OS_VPRISM_DIRECTION: SV
OD_OVR_VA: 20/
OD_AXIS: 035
OS_SPHERE: -5.50
OS_CYLINDER: SPHERE
OD_CYLINDER: -0.25
OS_CYLINDER: -0.25
OD_AXIS: 033
OD_VPRISM_DIRECTION: SV
OD_OVR_VA: 20/
OD_SPHERE: -6.50
OD_CYLINDER: -0.25
OS_AXIS: 059
OS_OVR_VA: 20/

## 2023-10-19 ASSESSMENT — SPHEQUIV_DERIVED
OD_SPHEQUIV: -6.625
OD_SPHEQUIV: -6.625
OS_SPHEQUIV: -6.875
OS_SPHEQUIV: -6.625
OD_SPHEQUIV: -7.125
OS_SPHEQUIV: -6.875

## 2023-10-19 ASSESSMENT — AXIALLENGTH_DERIVED
OS_AL: 26.3245
OS_AL: 26.2038
OD_AL: 26.1473
OS_AL: 26.3245
OD_AL: 26.3888
OD_AL: 26.1473

## 2023-10-19 ASSESSMENT — REFRACTION_MANIFEST
OS_CYLINDER: -0.25
OD_CYLINDER: -0.25
OS_CYLINDER: -0.25
OD_AXIS: 035
OD_VA1: 20/20-2
OS_VA1: 20/20-1
OD_VA1: 20/20-2
OD_SPHERE: -7.00
OS_AXIS: 075
OS_SPHERE: -6.50
OD_SPHERE: -6.50
OD_CYLINDER: -0.25
OD_AXIS: 035
OS_AXIS: 075
OS_VA1: 20/20-1
OS_SPHERE: -6.75

## 2023-10-19 ASSESSMENT — REFRACTION_AUTOREFRACTION
OS_AXIS: 074
OD_AXIS: 051
OD_CYLINDER: -0.25
OS_CYLINDER: -0.25
OD_SPHERE: -6.50
OS_SPHERE: -6.75

## 2023-10-19 ASSESSMENT — KERATOMETRY
OD_K2POWER_DIOPTERS: 44.50
OD_AXISANGLE_DEGREES: 104
OS_K2POWER_DIOPTERS: 44.25
OD_K1POWER_DIOPTERS: 44.00
OS_AXISANGLE_DEGREES: 099
OS_K1POWER_DIOPTERS: 44.00
METHOD_AUTO_MANUAL: AUTO

## 2023-10-19 ASSESSMENT — VISUAL ACUITY
OS_BCVA: 20/25+
OD_BCVA: 20/20-2

## 2023-10-19 ASSESSMENT — CONFRONTATIONAL VISUAL FIELD TEST (CVF)
OD_FINDINGS: FULL
OS_FINDINGS: FULL

## 2023-10-19 ASSESSMENT — TONOMETRY: OS_IOP_MMHG: 10

## 2023-11-03 ENCOUNTER — EMERGENCY (EMERGENCY)
Facility: HOSPITAL | Age: 38
LOS: 1 days | Discharge: HOME | End: 2023-11-03
Attending: EMERGENCY MEDICINE | Admitting: EMERGENCY MEDICINE
Payer: MEDICAID

## 2023-11-03 VITALS
RESPIRATION RATE: 18 BRPM | HEART RATE: 62 BPM | SYSTOLIC BLOOD PRESSURE: 107 MMHG | OXYGEN SATURATION: 100 % | DIASTOLIC BLOOD PRESSURE: 65 MMHG | TEMPERATURE: 98 F

## 2023-11-03 VITALS
HEART RATE: 130 BPM | SYSTOLIC BLOOD PRESSURE: 115 MMHG | TEMPERATURE: 98 F | RESPIRATION RATE: 16 BRPM | OXYGEN SATURATION: 100 % | DIASTOLIC BLOOD PRESSURE: 83 MMHG

## 2023-11-03 DIAGNOSIS — Z95.0 PRESENCE OF CARDIAC PACEMAKER: Chronic | ICD-10-CM

## 2023-11-03 DIAGNOSIS — Z95.810 PRESENCE OF AUTOMATIC (IMPLANTABLE) CARDIAC DEFIBRILLATOR: Chronic | ICD-10-CM

## 2023-11-03 LAB
ALBUMIN SERPL ELPH-MCNC: 4.2 G/DL — SIGNIFICANT CHANGE UP (ref 3.3–5)
ALBUMIN SERPL ELPH-MCNC: 4.2 G/DL — SIGNIFICANT CHANGE UP (ref 3.3–5)
ALP SERPL-CCNC: 94 U/L — SIGNIFICANT CHANGE UP (ref 40–120)
ALP SERPL-CCNC: 94 U/L — SIGNIFICANT CHANGE UP (ref 40–120)
ALT FLD-CCNC: 67 U/L — HIGH (ref 4–41)
ALT FLD-CCNC: 67 U/L — HIGH (ref 4–41)
ANION GAP SERPL CALC-SCNC: 13 MMOL/L — SIGNIFICANT CHANGE UP (ref 7–14)
ANION GAP SERPL CALC-SCNC: 13 MMOL/L — SIGNIFICANT CHANGE UP (ref 7–14)
AST SERPL-CCNC: 37 U/L — SIGNIFICANT CHANGE UP (ref 4–40)
AST SERPL-CCNC: 37 U/L — SIGNIFICANT CHANGE UP (ref 4–40)
BASOPHILS # BLD AUTO: 0.03 K/UL — SIGNIFICANT CHANGE UP (ref 0–0.2)
BASOPHILS # BLD AUTO: 0.03 K/UL — SIGNIFICANT CHANGE UP (ref 0–0.2)
BASOPHILS NFR BLD AUTO: 0.4 % — SIGNIFICANT CHANGE UP (ref 0–2)
BASOPHILS NFR BLD AUTO: 0.4 % — SIGNIFICANT CHANGE UP (ref 0–2)
BILIRUB SERPL-MCNC: 0.4 MG/DL — SIGNIFICANT CHANGE UP (ref 0.2–1.2)
BILIRUB SERPL-MCNC: 0.4 MG/DL — SIGNIFICANT CHANGE UP (ref 0.2–1.2)
BUN SERPL-MCNC: 16 MG/DL — SIGNIFICANT CHANGE UP (ref 7–23)
BUN SERPL-MCNC: 16 MG/DL — SIGNIFICANT CHANGE UP (ref 7–23)
CALCIUM SERPL-MCNC: 9.3 MG/DL — SIGNIFICANT CHANGE UP (ref 8.4–10.5)
CALCIUM SERPL-MCNC: 9.3 MG/DL — SIGNIFICANT CHANGE UP (ref 8.4–10.5)
CHLORIDE SERPL-SCNC: 103 MMOL/L — SIGNIFICANT CHANGE UP (ref 98–107)
CHLORIDE SERPL-SCNC: 103 MMOL/L — SIGNIFICANT CHANGE UP (ref 98–107)
CO2 SERPL-SCNC: 25 MMOL/L — SIGNIFICANT CHANGE UP (ref 22–31)
CO2 SERPL-SCNC: 25 MMOL/L — SIGNIFICANT CHANGE UP (ref 22–31)
CREAT SERPL-MCNC: 1.08 MG/DL — SIGNIFICANT CHANGE UP (ref 0.5–1.3)
CREAT SERPL-MCNC: 1.08 MG/DL — SIGNIFICANT CHANGE UP (ref 0.5–1.3)
EGFR: 91 ML/MIN/1.73M2 — SIGNIFICANT CHANGE UP
EGFR: 91 ML/MIN/1.73M2 — SIGNIFICANT CHANGE UP
EOSINOPHIL # BLD AUTO: 0.16 K/UL — SIGNIFICANT CHANGE UP (ref 0–0.5)
EOSINOPHIL # BLD AUTO: 0.16 K/UL — SIGNIFICANT CHANGE UP (ref 0–0.5)
EOSINOPHIL NFR BLD AUTO: 2.1 % — SIGNIFICANT CHANGE UP (ref 0–6)
EOSINOPHIL NFR BLD AUTO: 2.1 % — SIGNIFICANT CHANGE UP (ref 0–6)
GLUCOSE SERPL-MCNC: 128 MG/DL — HIGH (ref 70–99)
GLUCOSE SERPL-MCNC: 128 MG/DL — HIGH (ref 70–99)
HCT VFR BLD CALC: 44.7 % — SIGNIFICANT CHANGE UP (ref 39–50)
HCT VFR BLD CALC: 44.7 % — SIGNIFICANT CHANGE UP (ref 39–50)
HGB BLD-MCNC: 15.2 G/DL — SIGNIFICANT CHANGE UP (ref 13–17)
HGB BLD-MCNC: 15.2 G/DL — SIGNIFICANT CHANGE UP (ref 13–17)
IANC: 4.44 K/UL — SIGNIFICANT CHANGE UP (ref 1.8–7.4)
IANC: 4.44 K/UL — SIGNIFICANT CHANGE UP (ref 1.8–7.4)
IMM GRANULOCYTES NFR BLD AUTO: 0.3 % — SIGNIFICANT CHANGE UP (ref 0–0.9)
IMM GRANULOCYTES NFR BLD AUTO: 0.3 % — SIGNIFICANT CHANGE UP (ref 0–0.9)
LYMPHOCYTES # BLD AUTO: 2.4 K/UL — SIGNIFICANT CHANGE UP (ref 1–3.3)
LYMPHOCYTES # BLD AUTO: 2.4 K/UL — SIGNIFICANT CHANGE UP (ref 1–3.3)
LYMPHOCYTES # BLD AUTO: 31.5 % — SIGNIFICANT CHANGE UP (ref 13–44)
LYMPHOCYTES # BLD AUTO: 31.5 % — SIGNIFICANT CHANGE UP (ref 13–44)
MAGNESIUM SERPL-MCNC: 1.9 MG/DL — SIGNIFICANT CHANGE UP (ref 1.6–2.6)
MAGNESIUM SERPL-MCNC: 1.9 MG/DL — SIGNIFICANT CHANGE UP (ref 1.6–2.6)
MCHC RBC-ENTMCNC: 27.7 PG — SIGNIFICANT CHANGE UP (ref 27–34)
MCHC RBC-ENTMCNC: 27.7 PG — SIGNIFICANT CHANGE UP (ref 27–34)
MCHC RBC-ENTMCNC: 34 GM/DL — SIGNIFICANT CHANGE UP (ref 32–36)
MCHC RBC-ENTMCNC: 34 GM/DL — SIGNIFICANT CHANGE UP (ref 32–36)
MCV RBC AUTO: 81.4 FL — SIGNIFICANT CHANGE UP (ref 80–100)
MCV RBC AUTO: 81.4 FL — SIGNIFICANT CHANGE UP (ref 80–100)
MONOCYTES # BLD AUTO: 0.58 K/UL — SIGNIFICANT CHANGE UP (ref 0–0.9)
MONOCYTES # BLD AUTO: 0.58 K/UL — SIGNIFICANT CHANGE UP (ref 0–0.9)
MONOCYTES NFR BLD AUTO: 7.6 % — SIGNIFICANT CHANGE UP (ref 2–14)
MONOCYTES NFR BLD AUTO: 7.6 % — SIGNIFICANT CHANGE UP (ref 2–14)
NEUTROPHILS # BLD AUTO: 4.44 K/UL — SIGNIFICANT CHANGE UP (ref 1.8–7.4)
NEUTROPHILS # BLD AUTO: 4.44 K/UL — SIGNIFICANT CHANGE UP (ref 1.8–7.4)
NEUTROPHILS NFR BLD AUTO: 58.1 % — SIGNIFICANT CHANGE UP (ref 43–77)
NEUTROPHILS NFR BLD AUTO: 58.1 % — SIGNIFICANT CHANGE UP (ref 43–77)
NRBC # BLD: 0 /100 WBCS — SIGNIFICANT CHANGE UP (ref 0–0)
NRBC # BLD: 0 /100 WBCS — SIGNIFICANT CHANGE UP (ref 0–0)
NRBC # FLD: 0 K/UL — SIGNIFICANT CHANGE UP (ref 0–0)
NRBC # FLD: 0 K/UL — SIGNIFICANT CHANGE UP (ref 0–0)
NT-PROBNP SERPL-SCNC: 254 PG/ML — SIGNIFICANT CHANGE UP
NT-PROBNP SERPL-SCNC: 254 PG/ML — SIGNIFICANT CHANGE UP
PHOSPHATE SERPL-MCNC: 2.6 MG/DL — SIGNIFICANT CHANGE UP (ref 2.5–4.5)
PHOSPHATE SERPL-MCNC: 2.6 MG/DL — SIGNIFICANT CHANGE UP (ref 2.5–4.5)
PLATELET # BLD AUTO: 245 K/UL — SIGNIFICANT CHANGE UP (ref 150–400)
PLATELET # BLD AUTO: 245 K/UL — SIGNIFICANT CHANGE UP (ref 150–400)
POTASSIUM SERPL-MCNC: 3.8 MMOL/L — SIGNIFICANT CHANGE UP (ref 3.5–5.3)
POTASSIUM SERPL-MCNC: 3.8 MMOL/L — SIGNIFICANT CHANGE UP (ref 3.5–5.3)
POTASSIUM SERPL-SCNC: 3.8 MMOL/L — SIGNIFICANT CHANGE UP (ref 3.5–5.3)
POTASSIUM SERPL-SCNC: 3.8 MMOL/L — SIGNIFICANT CHANGE UP (ref 3.5–5.3)
PROT SERPL-MCNC: 7.3 G/DL — SIGNIFICANT CHANGE UP (ref 6–8.3)
PROT SERPL-MCNC: 7.3 G/DL — SIGNIFICANT CHANGE UP (ref 6–8.3)
RBC # BLD: 5.49 M/UL — SIGNIFICANT CHANGE UP (ref 4.2–5.8)
RBC # BLD: 5.49 M/UL — SIGNIFICANT CHANGE UP (ref 4.2–5.8)
RBC # FLD: 12.1 % — SIGNIFICANT CHANGE UP (ref 10.3–14.5)
RBC # FLD: 12.1 % — SIGNIFICANT CHANGE UP (ref 10.3–14.5)
SODIUM SERPL-SCNC: 141 MMOL/L — SIGNIFICANT CHANGE UP (ref 135–145)
SODIUM SERPL-SCNC: 141 MMOL/L — SIGNIFICANT CHANGE UP (ref 135–145)
TROPONIN T, HIGH SENSITIVITY RESULT: 22 NG/L — SIGNIFICANT CHANGE UP
TROPONIN T, HIGH SENSITIVITY RESULT: 22 NG/L — SIGNIFICANT CHANGE UP
TROPONIN T, HIGH SENSITIVITY RESULT: 26 NG/L — SIGNIFICANT CHANGE UP
TROPONIN T, HIGH SENSITIVITY RESULT: 26 NG/L — SIGNIFICANT CHANGE UP
WBC # BLD: 7.63 K/UL — SIGNIFICANT CHANGE UP (ref 3.8–10.5)
WBC # BLD: 7.63 K/UL — SIGNIFICANT CHANGE UP (ref 3.8–10.5)
WBC # FLD AUTO: 7.63 K/UL — SIGNIFICANT CHANGE UP (ref 3.8–10.5)
WBC # FLD AUTO: 7.63 K/UL — SIGNIFICANT CHANGE UP (ref 3.8–10.5)

## 2023-11-03 PROCEDURE — 93282 PRGRMG EVAL IMPLANTABLE DFB: CPT | Mod: 26

## 2023-11-03 PROCEDURE — 71045 X-RAY EXAM CHEST 1 VIEW: CPT | Mod: 26

## 2023-11-03 PROCEDURE — 99204 OFFICE O/P NEW MOD 45 MIN: CPT | Mod: 25

## 2023-11-03 PROCEDURE — 99285 EMERGENCY DEPT VISIT HI MDM: CPT

## 2023-11-03 PROCEDURE — 93010 ELECTROCARDIOGRAM REPORT: CPT

## 2023-11-03 RX ORDER — APIXABAN 2.5 MG/1
1 TABLET, FILM COATED ORAL
Qty: 60 | Refills: 0
Start: 2023-11-03 | End: 2023-12-02

## 2023-11-03 RX ORDER — APIXABAN 2.5 MG/1
5 TABLET, FILM COATED ORAL ONCE
Refills: 0 | Status: COMPLETED | OUTPATIENT
Start: 2023-11-03 | End: 2023-11-03

## 2023-11-03 RX ADMIN — APIXABAN 5 MILLIGRAM(S): 2.5 TABLET, FILM COATED ORAL at 17:54

## 2023-11-03 NOTE — CONSULT NOTE ADULT - SUBJECTIVE AND OBJECTIVE BOX
Patient is a 37y old  Male who presents with a chief complaint of         HPI:  37 year old male with a PMH of dilated cardiomyopathy, HLD, single chamber ICD (Abbott) since , VT-was on short term Amiodarone therapy and Eliquis (28 days only), ICD shocks s/p successful VT ablation 2022 by Dr. Gurrola who presents with c/o palpitations started around 10am today.  EKG obtained at 11:35 am demonstrates atrial flutter with variable AVB at 130 bpm.  Patient self converted from PAFL to SR shortly. Presently in SR with occasional PVC's and APC's.  ICD interrogation with normal findings no VT/Vf events.  No AF information available from ICD.  Patient endorses palpitations only denies SOB/CP or near syncope/syncope.  No triggering factors identified.  Since his VT ablation last December he didn't experience any similar palpitations.  Currently maintained on Metoprolol 100mg bid.  He follows with his EP Dr. Arteaga at [facility]. His ICD was interrogated on oct 23.    Echo from 2022 showed mild-moderate LV systolic dysfunction.                     PAST MEDICAL & SURGICAL HISTORY:  Ventricular tachycardia s/p ICD shocks   VT ablation 2022      Pacemaker      AICD (automatic cardioverter/defibrillator) present  [place] placed 2015          MEDICATIONS  (STANDING): ASA 81mg daily; Metoprolol 100mg bid; Lisinopril 2.5mg daily; Lipitor 20 mg daily    MEDICATIONS  (PRN):    Allergies    No Known Allergies    Intolerances      FAMILY HISTORY:  Family history of stroke (Mother)    Family history of acute myocardial infarction (Mother)        SOCIAL HISTORY:  Denies smoking; no   Alcohol  or  Drug abuse               REVIEW OF SYSTEMS:    CONSTITUTIONAL: No fever, weight loss, chills, shakes, or fatigue  EYES: No eye pain, visual disturbances, or discharge  ENMT:  No difficulty hearing, tinnitus, vertigo; No sinus or throat pain  NECK: No pain or stiffness  RESPIRATORY: No cough, wheezing, hemoptysis, or shortness of breath  CARDIOVASCULAR: No chest pain, dyspnea, dizziness, syncope, paroxysmal nocturnal dyspnea, orthopnea, or arm or leg swelling +palpitations,   GASTROINTESTINAL: No abdominal  or epigastric pain, nausea, vomiting, hematemesis, diarrhea, constipation, melena or bright red blood.  GENITOURINARY: No dysuria, nocturia, hematuria, or urinary incontinence  NEUROLOGICAL: No headaches, memory loss, slurred speech, limb weakness, loss of strength, numbness, or tremors  SKIN: No itching, burning, rashes, or lesions   LYMPH NODES: No enlarged glands  ENDOCRINE: No heat or cold intolerance, or hair loss  MUSCULOSKELETAL: No joint pain or swelling, muscle, back, or extremity pain  PSYCHIATRIC: No depression, anxiety, or difficulty sleeping  HEME/LYMPH: No easy bruising or bleeding gums  ALLERY AND IMMUNOLOGIC: No hives or rash.      Vital Signs Last 24 Hrs  T(C): 36.7 (2023 11:19), Max: 36.7 (2023 11:19)  T(F): 98.1 (2023 11:19), Max: 98.1 (2023 11:)  HR: 66 (2023 11:26) (66 - 130)  BP: 115/83 (2023 11:19) (115/83 - 115/83)  BP(mean): --  RR: 16 (2023 11:19) (16 - 16)  SpO2: 100% (2023 11:19) (100% - 100%)    Parameters below as of 2023 11:19  Patient On (Oxygen Delivery Method): room air        PHYSICAL EXAM:    GENERAL: In no apparent distress  HEAD:  Atraumatic, Normocephalic  NECK: Supple . No JVD or carotid bruit or thyroidmegaly.  Carotid pulse is 2+ bilaterally.  PULMONARY: Clear to auscultation and perfusion.  No rales, wheezing, or rhonchi bilaterally.  HEART: Regular rate and rhythm; No murmurs, rubs, or gallops. L ICD  ABDOMEN: Soft, Nontender, Nondistended; Bowel sounds present  EXTREMITIES: No clubbing, cyanosis, or edema  NEUROLOGICAL: Alert oriented to person, place and time.  Speech clear.  Skin: Dry intact, no rashes or lesions.          INTERPRETATION OF TELEMETRY:  Sinus rhythm with occasional PVC's/APC's at 60's bpm    EC/3 at 11: 35  AFL with variable AVB at 130, LAFB; QRS 114ms        LABS:                        15.2   7.63  )-----------( 245      ( 2023 12:20 )             44.7         141  |  103  |  16  ----------------------------<  128<H>  3.8   |  25  |  1.08    Ca    9.3      2023 12:20  Phos  2.6       Mg     1.90         TPro  7.3  /  Alb  4.2  /  TBili  0.4  /  DBili  x   /  AST  37  /  ALT  67<H>  /  AlkPhos  94            Urinalysis Basic - ( 2023 12:20 )    Color: x / Appearance: x / SG: x / pH: x  Gluc: 128 mg/dL / Ketone: x  / Bili: x / Urobili: x   Blood: x / Protein: x / Nitrite: x   Leuk Esterase: x / RBC: x / WBC x   Sq Epi: x / Non Sq Epi: x / Bacteria: x        BNP  RADIOLOGY & ADDITIONAL STUDIES:  PREVIOUS DIAGNOSTIC TESTING:      ECHO FINDINGS:  DIMENSIONS:  Dimensions:     Normal Values:  LA:     3.7 cm    2.0 - 4.0 cm  Ao:     3.3 cm    2.0 - 3.8 cm  SEPTUM: 1.1 cm    0.6 - 1.2 cm  PWT:    1.1 cm    0.6 - 1.1 cm  LVIDd:  4.5 cm    3.0 - 5.6 cm  LVIDs:  2.7 cm    1.8 - 4.0 cm  Derived Variables:  LVMI: 91 g/m2  RWT: 0.48  Fractional short: 40 %  ------------------------------------------------------------------------  OBSERVATIONS:  Aortic Root: Normal aortic root.  Aortic Valve: Normal trileaflet aortic valve.  Left Atrium: Normal left atrium.  LA volume index = 30  cc/m2.  Left Ventricle: Endocardium not well visualized; grossly at  least mild tomoderate left ventricular dysfunction with  hypokinesis of the inferior and inferolateral walls  Pericardium/PleuraNormal pericardium with no pericardial  effusion.  Hemodynamic: Estimated right ventricular systolic pressure  equals 30 mm Hg, assuming right atrial pressure equals 10  mm Hg, consistent with normal pulmonary pressures.  ------------------------------------------------------------------------  CONCLUSIONS:  Very limited TTE  1. Endocardium not well visualized; grossly at least mild  to moderate left ventricular dysfunction with hypokinesis  of the inferior and inferolateral walls  ------------------------------------------------------------------------  Confirmed on  2022 - 18:40:33 by Genoveva Cast MD      STRESS FINDINGS:    CATHETERIZATION FINDINGS:

## 2023-11-03 NOTE — CONSULT NOTE ADULT - NS ATTEND AMEND GEN_ALL_CORE FT
37 year old male with a PMH of dilated cardiomyopathy, HLD, single chamber ICD (Abbott) since 2015, VT well known to me s/p VT ablation with me on 12/2022 who presents with palpitations and noted to be in AF/AFL. He self converted. Please start Eliquis 5mg BID. Will follow up with his EP Dr Arteaga with potential plans for ablation.

## 2023-11-03 NOTE — ED PROVIDER NOTE - PHYSICAL EXAMINATION
Gen: Patient is well-appearing, NAD, AAOx3, able to ambulate without assistance  HEENT: NCAT, normal conjunctiva, tongue midline, oral mucosa moist  Lung: CTAB, no respiratory distress, no wheezes/rhonchi/rales B/L, speaking in full sentences  CV: irregular rhythm, no murmurs, rubs or gallops, distal pulses 2+ b/l  Abd: soft, NT, ND, no guarding, no rigidity, no rebound tenderness, no CVA tenderness   MSK: no visible deformities, ROM normal in UE/LE  Neuro: No focal sensory or motor deficits  Skin: Warm, well perfused, no rash, no leg swelling  Psych: normal affect, calm

## 2023-11-03 NOTE — PROCEDURE NOTE - ADDITIONAL PROCEDURE DETAILS
p/w c/o palpitations +PAFL at 120  See EP consult  No AFib or AFL information as this is a single RV chamber ICD

## 2023-11-03 NOTE — ED PROVIDER NOTE - PROGRESS NOTE DETAILS
Pt received as sign-out from Dr. Enamorado, awaiting EP recs. Pt now seen and assessed by EP team. Recommending dc on Eliquis 5mg but at this time will not make changes to any rate control/anti-arrythmic medications. Pt no longer feeling symptomatic. Can follow-up with his EP from [facility] on Monday. Feels comfortable with plan for dc and expressed understanding of emergent return to ED instructions.

## 2023-11-03 NOTE — ED ADULT NURSE NOTE - OBJECTIVE STATEMENT
received pt in TRB, 37 yr/o male A+OX4, ambulatory at baseline. past medical history of SVT has AICD in place. presented to the ED C/O palpations in the AM. states he was sitting eating breakfast when onset of palpations occurred. denies AICD firing, denies feelings of palpations at this time. +2 radial and pedal pulses noted.  VSS, denies chest pain and SOB, RR even and unlabored. PERRLA and facial symmetry noted. pt denies numbness, tingling, and weakness in peripheral extremities. abdomen is flat, soft, nontender; denies nausea, vomiting, diarrhea, and constipation. pt has active and passive ROM of all extremities, no obvious joint deformities noted. skin is clean dry and intact. right forearm 20g placed, labs drawn and sent. pt is stable at this time.

## 2023-11-03 NOTE — CONSULT NOTE ADULT - ASSESSMENT
37 year old male with a PMH of dilated cardiomyopathy, HLD, single chamber ICD ([employer]ensen) since 2015, VT -was on short term Amiodarone therapy and Eliquis (28 days only) , ICD shocks s/p successful VT(anterolateral pap muscle PVC) ablation 12/2022 by Dr. Gurrola who presents with c/o palpitations started around 10am today.  EKG obtained at 11:35 am demonstrates atrial flutter with variable AVB at 130 bpm.  Patient self converted from PAFL to SR shortly. Presently in SR with occasional PVC's and APC's.  ICD interrogation with normal findings no VT/Vf events.  No AF information available from ICD.  Patient endorses palpitations only denies SOB/CP or near syncope/syncope.  Since his VT ablation last December he didn't experience any similar palpitations.  Currently maintained on Metoprolol 100mg bid.  He follows with his EP Dr. Arteaga at [facility]. His ICD was interrogated on oct 23.      symptomatic paroxysmal atrial flutter with RVR (~2 hours) self converted to SR   Patient's CHADS2-VASC2 score is 1 (CHF) which correlates to 1.3% Stroke rate, may benefit from anticoagulation therapy recommended for thromboembolic prophylaxis.   --Continue Metoprolol 100mg bid for rate control  -Patient likely will benefit from AFL ablation   Will d/w with Dr. Gurrola regarding potential indication of antiarrhythmic drugs

## 2023-11-03 NOTE — ED PROVIDER NOTE - NSFOLLOWUPINSTRUCTIONS_ED_ALL_ED_FT
You were seen in the ED today for palpitations. After assessment by our EP team, it was concerning for likely paroxysmal atrial fibrillation. Please follow-up with your Electrophysiologist on Monday    Continue all home medications as previously prescribed. We will be adding a blood thinner called Eliquis which should be taken two times a day. (Eliquis 5mg). [facility] [address] [facility] [place]. This medications makes you more prone to bleeding both from cuts and trauma like head strikes.    Return to ED if you experience recurrent symptoms, chest pain, dizziness/lightheadedness, head trauma or falls while taking Eliquis, persistent bleeding that does not stop with pressure, blood in urine or stool, or anything else concerning to you.

## 2023-11-03 NOTE — ED ADULT NURSE REASSESSMENT NOTE - NS ED NURSE REASSESS COMMENT FT1
break cpt A&Ox4, vitally stable. denies chest pain, SOB,n/v,headache, dizziness, numbness/tingling to hands/feet. given eliquis. educated on s/s of bleeding precautions. safety maintained. iv removed

## 2023-11-03 NOTE — ED PROVIDER NOTE - CLINICAL SUMMARY MEDICAL DECISION MAKING FREE TEXT BOX
37 y M, hx of cardiomyopathy, VT storm, s/p AICD placement on metoprolol, presenting with 1 day onset of palpitation.  Patient reports that he was eating oatmeal at 10 AM and symptoms started spontaneously.  Reports that he took his metoprolol 100 mg prior to the symptom initiation.  Denies chest pain, shortness of breath, fever, recent illness including cough congestion, nausea, vomiting, diarrhea. Did not noticed any shock from AICD device. Heart rate 60, /71, vital signs within normal limits on arrival.  Initial EKG shows a flutter with ventricular rate of 130.  Physical exam patient is well-appearing, not in acute distress, normal respiratory, clear lung exam bilaterally, abdomen soft, nontender, no leg swelling noted.  Will get labs, cardiac markers, cardiac monitor, EP consult for device interrogation, reassess.

## 2023-11-03 NOTE — ED PROVIDER NOTE - OBJECTIVE STATEMENT
37 y M, hx of cardiomyopathy, VT storm, s/p AICD placement on metoprolol, presenting with 1 day onset of palpitation.  Patient reports that he was eating oatmeal at 10 AM and symptoms started spontaneously.  Reports that he took his metoprolol 100 mg prior to the symptom initiation.  Denies chest pain, shortness of breath, fever, recent illness including cough congestion, nausea, vomiting, diarrhea. Did not noticed any shock from AICD device.

## 2023-11-03 NOTE — ED PROVIDER NOTE - ATTENDING CONTRIBUTION TO CARE
Dr. Enamorado: This is a 37-year-old male with past medical history of cardiomyopathy of unknown origin, AICD in place, VT storm in the past now status post ablation, on metoprolol, Dr. Enamorado: This is a 37-year-old male with past medical history of cardiomyopathy of unknown origin, AICD in place, VT storm in the past now status post ablation, on metoprolol, in the emergency department with less than  2 hours of palpitations.  Patient states that he was eating oatmeal this morning, no caffeine, and he suddenly felt like his heart was racing, without chest pain or shortness of breath.  He took his metoprolol around the same time as symptoms beginning.  He came to the emergency department for evaluation, states that symptoms have somewhat improved since beginning, but they are not gone.  He denies chest pain, shortness of breath, nausea, vomiting, diarrhea.  On exam pt well appearing, in NAD, heart irregular, lungs CTAB, abd NTND, extremities without swelling, strength 5/5 in all extremities and skin without rash.  EKG showing ?aflutter, which pt states he has never been told he had.

## 2023-11-03 NOTE — ED PROVIDER NOTE - PATIENT PORTAL LINK FT
You can access the FollowMyHealth Patient Portal offered by [facility] by registering at the following website: [email or web address]. By joining [facility]’s FollowMyHealth portal, you will also be able to view your health information using other applications (apps) compatible with our system.

## 2023-11-03 NOTE — ED ADULT TRIAGE NOTE - CHIEF COMPLAINT QUOTE
pt c/o palpations starting 20 min ago, pt has AICD d/t hx of vtach, denies chest pain, sob, dizziness, nausea.

## 2023-11-07 ENCOUNTER — NON-APPOINTMENT (OUTPATIENT)
Age: 38
End: 2023-11-07

## 2023-11-08 ENCOUNTER — APPOINTMENT (OUTPATIENT)
Dept: PULMONOLOGY | Facility: CLINIC | Age: 38
End: 2023-11-08
Payer: MEDICAID

## 2023-11-08 DIAGNOSIS — I48.91 UNSPECIFIED ATRIAL FIBRILLATION: ICD-10-CM

## 2023-11-08 DIAGNOSIS — Z78.9 OTHER SPECIFIED HEALTH STATUS: ICD-10-CM

## 2023-11-08 DIAGNOSIS — Z95.810 PRESENCE OF AUTOMATIC (IMPLANTABLE) CARDIAC DEFIBRILLATOR: ICD-10-CM

## 2023-11-08 DIAGNOSIS — G47.33 OBSTRUCTIVE SLEEP APNEA (ADULT) (PEDIATRIC): ICD-10-CM

## 2023-11-08 DIAGNOSIS — Z91.89 OTHER SPECIFIED PERSONAL RISK FACTORS, NOT ELSEWHERE CLASSIFIED: ICD-10-CM

## 2023-11-08 DIAGNOSIS — Z86.79 PERSONAL HISTORY OF OTHER DISEASES OF THE CIRCULATORY SYSTEM: ICD-10-CM

## 2023-11-08 PROCEDURE — 99203 OFFICE O/P NEW LOW 30 MIN: CPT | Mod: 95

## 2024-04-01 NOTE — ED ADULT NURSE NOTE - BEFAST SPEECH SLURRED
Patient: Coretta Huitron    Procedure: Procedure(s):  Colonoscopy       Anesthesia Type:  MAC    Note:  Disposition: Outpatient   Postop Pain Control: Uneventful            Sign Out: Well controlled pain   PONV: No   Neuro/Psych: Uneventful            Sign Out: Acceptable/Baseline neuro status   Airway/Respiratory: Uneventful            Sign Out: Acceptable/Baseline resp. status   CV/Hemodynamics: Uneventful            Sign Out: Acceptable CV status; No obvious hypovolemia; No obvious fluid overload   Other NRE:    DID A NON-ROUTINE EVENT OCCUR?            Last vitals:  Vitals Value Taken Time   /73 04/01/24 0930   Temp     Pulse 67 04/01/24 0932   Resp 26 04/01/24 0932   SpO2 95 % 04/01/24 0932   Vitals shown include unfiled device data.    Electronically Signed By: Pao Sampson MD  April 1, 2024  11:14 AM  
No

## 2024-04-23 ENCOUNTER — APPOINTMENT (OUTPATIENT)
Dept: SLEEP CENTER | Facility: CLINIC | Age: 39
End: 2024-04-23

## 2024-10-30 ENCOUNTER — INPATIENT (INPATIENT)
Facility: HOSPITAL | Age: 39
LOS: 2 days | Discharge: HOME | End: 2024-11-02
Attending: INTERNAL MEDICINE | Admitting: INTERNAL MEDICINE
Payer: COMMERCIAL

## 2024-10-30 ENCOUNTER — RESULT REVIEW (OUTPATIENT)
Age: 39
End: 2024-10-30

## 2024-10-30 VITALS
TEMPERATURE: 98 F | OXYGEN SATURATION: 97 % | WEIGHT: 179.9 LBS | DIASTOLIC BLOOD PRESSURE: 87 MMHG | RESPIRATION RATE: 16 BRPM | HEIGHT: 70 IN | SYSTOLIC BLOOD PRESSURE: 145 MMHG | HEART RATE: 129 BPM

## 2024-10-30 DIAGNOSIS — I48.91 UNSPECIFIED ATRIAL FIBRILLATION: ICD-10-CM

## 2024-10-30 DIAGNOSIS — Z95.0 PRESENCE OF CARDIAC PACEMAKER: Chronic | ICD-10-CM

## 2024-10-30 DIAGNOSIS — Z95.810 PRESENCE OF AUTOMATIC (IMPLANTABLE) CARDIAC DEFIBRILLATOR: Chronic | ICD-10-CM

## 2024-10-30 LAB
ADD ON TEST-SPECIMEN IN LAB: SIGNIFICANT CHANGE UP
ALBUMIN SERPL ELPH-MCNC: 4.2 G/DL — SIGNIFICANT CHANGE UP (ref 3.3–5)
ALP SERPL-CCNC: 106 U/L — SIGNIFICANT CHANGE UP (ref 40–120)
ALT FLD-CCNC: 46 U/L — HIGH (ref 4–41)
ANION GAP SERPL CALC-SCNC: 16 MMOL/L — HIGH (ref 7–14)
APPEARANCE UR: CLEAR — SIGNIFICANT CHANGE UP
AST SERPL-CCNC: 32 U/L — SIGNIFICANT CHANGE UP (ref 4–40)
BACTERIA # UR AUTO: NEGATIVE /HPF — SIGNIFICANT CHANGE UP
BASOPHILS # BLD AUTO: 0.03 K/UL — SIGNIFICANT CHANGE UP (ref 0–0.2)
BASOPHILS NFR BLD AUTO: 0.4 % — SIGNIFICANT CHANGE UP (ref 0–2)
BILIRUB SERPL-MCNC: 0.3 MG/DL — SIGNIFICANT CHANGE UP (ref 0.2–1.2)
BILIRUB UR-MCNC: NEGATIVE — SIGNIFICANT CHANGE UP
BUN SERPL-MCNC: 18 MG/DL — SIGNIFICANT CHANGE UP (ref 7–23)
CALCIUM SERPL-MCNC: 9.6 MG/DL — SIGNIFICANT CHANGE UP (ref 8.4–10.5)
CAST: 0 /LPF — SIGNIFICANT CHANGE UP (ref 0–4)
CHLORIDE SERPL-SCNC: 104 MMOL/L — SIGNIFICANT CHANGE UP (ref 98–107)
CO2 SERPL-SCNC: 22 MMOL/L — SIGNIFICANT CHANGE UP (ref 22–31)
COLOR SPEC: YELLOW — SIGNIFICANT CHANGE UP
CREAT SERPL-MCNC: 1.08 MG/DL — SIGNIFICANT CHANGE UP (ref 0.5–1.3)
DIFF PNL FLD: NEGATIVE — SIGNIFICANT CHANGE UP
EGFR: 91 ML/MIN/1.73M2 — SIGNIFICANT CHANGE UP
EGFR: 91 ML/MIN/1.73M2 — SIGNIFICANT CHANGE UP
EOSINOPHIL # BLD AUTO: 0.17 K/UL — SIGNIFICANT CHANGE UP (ref 0–0.5)
EOSINOPHIL NFR BLD AUTO: 2 % — SIGNIFICANT CHANGE UP (ref 0–6)
GLUCOSE SERPL-MCNC: 102 MG/DL — HIGH (ref 70–99)
GLUCOSE UR QL: NEGATIVE MG/DL — SIGNIFICANT CHANGE UP
HCT VFR BLD CALC: 46.2 % — SIGNIFICANT CHANGE UP (ref 39–50)
HGB BLD-MCNC: 15.5 G/DL — SIGNIFICANT CHANGE UP (ref 13–17)
IANC: 5.66 K/UL — SIGNIFICANT CHANGE UP (ref 1.8–7.4)
IMM GRANULOCYTES NFR BLD AUTO: 0.4 % — SIGNIFICANT CHANGE UP (ref 0–0.9)
KETONES UR-MCNC: NEGATIVE MG/DL — SIGNIFICANT CHANGE UP
LEUKOCYTE ESTERASE UR-ACNC: NEGATIVE — SIGNIFICANT CHANGE UP
LYMPHOCYTES # BLD AUTO: 1.85 K/UL — SIGNIFICANT CHANGE UP (ref 1–3.3)
LYMPHOCYTES # BLD AUTO: 22.2 % — SIGNIFICANT CHANGE UP (ref 13–44)
MCHC RBC-ENTMCNC: 27.4 PG — SIGNIFICANT CHANGE UP (ref 27–34)
MCHC RBC-ENTMCNC: 33.5 G/DL — SIGNIFICANT CHANGE UP (ref 32–36)
MCV RBC AUTO: 81.6 FL — SIGNIFICANT CHANGE UP (ref 80–100)
MONOCYTES # BLD AUTO: 0.59 K/UL — SIGNIFICANT CHANGE UP (ref 0–0.9)
MONOCYTES NFR BLD AUTO: 7.1 % — SIGNIFICANT CHANGE UP (ref 2–14)
NEUTROPHILS # BLD AUTO: 5.66 K/UL — SIGNIFICANT CHANGE UP (ref 1.8–7.4)
NEUTROPHILS NFR BLD AUTO: 67.9 % — SIGNIFICANT CHANGE UP (ref 43–77)
NITRITE UR-MCNC: NEGATIVE — SIGNIFICANT CHANGE UP
NRBC # BLD AUTO: 0 K/UL — SIGNIFICANT CHANGE UP (ref 0–0)
NRBC # BLD: 0 /100 WBCS — SIGNIFICANT CHANGE UP (ref 0–0)
NRBC # FLD: 0 K/UL — SIGNIFICANT CHANGE UP (ref 0–0)
NRBC BLD-RTO: 0 /100 WBCS — SIGNIFICANT CHANGE UP (ref 0–0)
NT-PROBNP SERPL-SCNC: 348 PG/ML — HIGH
PH UR: 7 — SIGNIFICANT CHANGE UP (ref 5–8)
PLATELET # BLD AUTO: 259 K/UL — SIGNIFICANT CHANGE UP (ref 150–400)
POTASSIUM SERPL-MCNC: 4.5 MMOL/L — SIGNIFICANT CHANGE UP (ref 3.5–5.3)
POTASSIUM SERPL-SCNC: 4.5 MMOL/L — SIGNIFICANT CHANGE UP (ref 3.5–5.3)
PROT SERPL-MCNC: 7.3 G/DL — SIGNIFICANT CHANGE UP (ref 6–8.3)
PROT UR-MCNC: NEGATIVE MG/DL — SIGNIFICANT CHANGE UP
RBC # BLD: 5.66 M/UL — SIGNIFICANT CHANGE UP (ref 4.2–5.8)
RBC # FLD: 12 % — SIGNIFICANT CHANGE UP (ref 10.3–14.5)
RBC CASTS # UR COMP ASSIST: 0 /HPF — SIGNIFICANT CHANGE UP (ref 0–4)
SODIUM SERPL-SCNC: 142 MMOL/L — SIGNIFICANT CHANGE UP (ref 135–145)
SP GR SPEC: 1.01 — SIGNIFICANT CHANGE UP (ref 1–1.03)
SQUAMOUS # UR AUTO: 0 /HPF — SIGNIFICANT CHANGE UP (ref 0–5)
TROPONIN T, HIGH SENSITIVITY RESULT: 26 NG/L — SIGNIFICANT CHANGE UP
TROPONIN T, HIGH SENSITIVITY RESULT: 27 NG/L — SIGNIFICANT CHANGE UP
UROBILINOGEN FLD QL: 0.2 MG/DL — SIGNIFICANT CHANGE UP (ref 0.2–1)
WBC # BLD: 8.33 K/UL — SIGNIFICANT CHANGE UP (ref 3.8–10.5)
WBC # FLD AUTO: 8.33 K/UL — SIGNIFICANT CHANGE UP (ref 3.8–10.5)
WBC UR QL: 0 /HPF — SIGNIFICANT CHANGE UP (ref 0–5)

## 2024-10-30 PROCEDURE — 71046 X-RAY EXAM CHEST 2 VIEWS: CPT | Mod: 26

## 2024-10-30 PROCEDURE — 99231 SBSQ HOSP IP/OBS SF/LOW 25: CPT

## 2024-10-30 PROCEDURE — 93306 TTE W/DOPPLER COMPLETE: CPT | Mod: 26

## 2024-10-30 PROCEDURE — 99285 EMERGENCY DEPT VISIT HI MDM: CPT | Mod: 25

## 2024-10-30 RX ORDER — METOPROLOL SUCCINATE 50 MG/1
100 TABLET, EXTENDED RELEASE ORAL
Refills: 0 | Status: DISCONTINUED | OUTPATIENT
Start: 2024-10-30 | End: 2024-11-02

## 2024-10-30 RX ORDER — METOPROLOL SUCCINATE 50 MG/1
1 TABLET, EXTENDED RELEASE ORAL
Refills: 0 | DISCHARGE

## 2024-10-30 RX ORDER — APIXABAN 2.5 MG/1
1 TABLET, FILM COATED ORAL
Refills: 0 | DISCHARGE

## 2024-10-30 RX ORDER — DILTIAZEM HYDROCHLORIDE 240 MG/1
30 TABLET, EXTENDED RELEASE ORAL ONCE
Refills: 0 | Status: COMPLETED | OUTPATIENT
Start: 2024-10-30 | End: 2024-10-30

## 2024-10-30 RX ORDER — METOPROLOL SUCCINATE 50 MG/1
100 TABLET, EXTENDED RELEASE ORAL DAILY
Refills: 0 | Status: COMPLETED | OUTPATIENT
Start: 2024-10-30 | End: 2024-10-30

## 2024-10-30 RX ORDER — LISINOPRIL 5 MG/1
1 TABLET ORAL
Refills: 0 | DISCHARGE

## 2024-10-30 RX ORDER — ATORVASTATIN CALCIUM 80 MG/1
1 TABLET, FILM COATED ORAL
Refills: 0 | DISCHARGE

## 2024-10-30 RX ORDER — APIXABAN 2.5 MG/1
5 TABLET, FILM COATED ORAL EVERY 12 HOURS
Refills: 0 | Status: COMPLETED | OUTPATIENT
Start: 2024-10-30 | End: 2024-10-30

## 2024-10-30 RX ORDER — ATORVASTATIN CALCIUM 80 MG/1
20 TABLET, FILM COATED ORAL AT BEDTIME
Refills: 0 | Status: DISCONTINUED | OUTPATIENT
Start: 2024-10-30 | End: 2024-11-02

## 2024-10-30 RX ORDER — DILTIAZEM HYDROCHLORIDE 240 MG/1
20 TABLET, EXTENDED RELEASE ORAL ONCE
Refills: 0 | Status: COMPLETED | OUTPATIENT
Start: 2024-10-30 | End: 2024-10-30

## 2024-10-30 RX ORDER — FLECAINIDE ACETATE 50 MG
50 TABLET ORAL
Refills: 0 | Status: DISCONTINUED | OUTPATIENT
Start: 2024-10-30 | End: 2024-10-31

## 2024-10-30 RX ORDER — APIXABAN 2.5 MG/1
5 TABLET, FILM COATED ORAL EVERY 12 HOURS
Refills: 0 | Status: DISCONTINUED | OUTPATIENT
Start: 2024-10-30 | End: 2024-11-02

## 2024-10-30 RX ORDER — METOPROLOL SUCCINATE 50 MG/1
5 TABLET, EXTENDED RELEASE ORAL ONCE
Refills: 0 | Status: COMPLETED | OUTPATIENT
Start: 2024-10-30 | End: 2024-10-30

## 2024-10-30 RX ORDER — LISINOPRIL 5 MG/1
2.5 TABLET ORAL DAILY
Refills: 0 | Status: DISCONTINUED | OUTPATIENT
Start: 2024-10-30 | End: 2024-11-02

## 2024-10-30 RX ADMIN — METOPROLOL SUCCINATE 5 MILLIGRAM(S): 50 TABLET, EXTENDED RELEASE ORAL at 03:23

## 2024-10-30 RX ADMIN — METOPROLOL SUCCINATE 100 MILLIGRAM(S): 50 TABLET, EXTENDED RELEASE ORAL at 11:09

## 2024-10-30 RX ADMIN — APIXABAN 5 MILLIGRAM(S): 2.5 TABLET, FILM COATED ORAL at 11:08

## 2024-10-30 RX ADMIN — Medication 50 MILLIGRAM(S): at 17:47

## 2024-10-30 RX ADMIN — METOPROLOL SUCCINATE 100 MILLIGRAM(S): 50 TABLET, EXTENDED RELEASE ORAL at 17:47

## 2024-10-30 RX ADMIN — Medication 1000 MILLILITER(S): at 03:23

## 2024-10-30 RX ADMIN — APIXABAN 5 MILLIGRAM(S): 2.5 TABLET, FILM COATED ORAL at 17:47

## 2024-10-30 RX ADMIN — DILTIAZEM HYDROCHLORIDE 20 MILLIGRAM(S): 240 TABLET, EXTENDED RELEASE ORAL at 04:13

## 2024-10-30 RX ADMIN — DILTIAZEM HYDROCHLORIDE 30 MILLIGRAM(S): 240 TABLET, EXTENDED RELEASE ORAL at 05:48

## 2024-10-31 LAB
ALBUMIN SERPL ELPH-MCNC: 3.9 G/DL — SIGNIFICANT CHANGE UP (ref 3.3–5)
ALP SERPL-CCNC: 92 U/L — SIGNIFICANT CHANGE UP (ref 40–120)
ALT FLD-CCNC: 35 U/L — SIGNIFICANT CHANGE UP (ref 4–41)
ANION GAP SERPL CALC-SCNC: 16 MMOL/L — HIGH (ref 7–14)
APTT BLD: 34 SEC — SIGNIFICANT CHANGE UP (ref 24.5–35.6)
AST SERPL-CCNC: 18 U/L — SIGNIFICANT CHANGE UP (ref 4–40)
BASOPHILS # BLD AUTO: 0.03 K/UL — SIGNIFICANT CHANGE UP (ref 0–0.2)
BASOPHILS NFR BLD AUTO: 0.4 % — SIGNIFICANT CHANGE UP (ref 0–2)
BILIRUB SERPL-MCNC: 0.5 MG/DL — SIGNIFICANT CHANGE UP (ref 0.2–1.2)
BLD GP AB SCN SERPL QL: NEGATIVE — SIGNIFICANT CHANGE UP
BUN SERPL-MCNC: 13 MG/DL — SIGNIFICANT CHANGE UP (ref 7–23)
CALCIUM SERPL-MCNC: 9.4 MG/DL — SIGNIFICANT CHANGE UP (ref 8.4–10.5)
CHLORIDE SERPL-SCNC: 106 MMOL/L — SIGNIFICANT CHANGE UP (ref 98–107)
CO2 SERPL-SCNC: 20 MMOL/L — LOW (ref 22–31)
CREAT SERPL-MCNC: 0.93 MG/DL — SIGNIFICANT CHANGE UP (ref 0.5–1.3)
CULTURE RESULTS: SIGNIFICANT CHANGE UP
EGFR: 108 ML/MIN/1.73M2 — SIGNIFICANT CHANGE UP
EGFR: 108 ML/MIN/1.73M2 — SIGNIFICANT CHANGE UP
EOSINOPHIL # BLD AUTO: 0.14 K/UL — SIGNIFICANT CHANGE UP (ref 0–0.5)
EOSINOPHIL NFR BLD AUTO: 1.7 % — SIGNIFICANT CHANGE UP (ref 0–6)
GLUCOSE SERPL-MCNC: 89 MG/DL — SIGNIFICANT CHANGE UP (ref 70–99)
HCT VFR BLD CALC: 49.2 % — SIGNIFICANT CHANGE UP (ref 39–50)
HGB BLD-MCNC: 16.4 G/DL — SIGNIFICANT CHANGE UP (ref 13–17)
IANC: 5.32 K/UL — SIGNIFICANT CHANGE UP (ref 1.8–7.4)
IMM GRANULOCYTES NFR BLD AUTO: 0.2 % — SIGNIFICANT CHANGE UP (ref 0–0.9)
INR BLD: 1.29 RATIO — HIGH (ref 0.85–1.16)
LYMPHOCYTES # BLD AUTO: 2.31 K/UL — SIGNIFICANT CHANGE UP (ref 1–3.3)
LYMPHOCYTES # BLD AUTO: 27.4 % — SIGNIFICANT CHANGE UP (ref 13–44)
MCHC RBC-ENTMCNC: 27 PG — SIGNIFICANT CHANGE UP (ref 27–34)
MCHC RBC-ENTMCNC: 33.3 G/DL — SIGNIFICANT CHANGE UP (ref 32–36)
MCV RBC AUTO: 81.1 FL — SIGNIFICANT CHANGE UP (ref 80–100)
MONOCYTES # BLD AUTO: 0.6 K/UL — SIGNIFICANT CHANGE UP (ref 0–0.9)
MONOCYTES NFR BLD AUTO: 7.1 % — SIGNIFICANT CHANGE UP (ref 2–14)
NEUTROPHILS # BLD AUTO: 5.32 K/UL — SIGNIFICANT CHANGE UP (ref 1.8–7.4)
NEUTROPHILS NFR BLD AUTO: 63.2 % — SIGNIFICANT CHANGE UP (ref 43–77)
NRBC # BLD AUTO: 0 K/UL — SIGNIFICANT CHANGE UP (ref 0–0)
NRBC # BLD: 0 /100 WBCS — SIGNIFICANT CHANGE UP (ref 0–0)
NRBC # FLD: 0 K/UL — SIGNIFICANT CHANGE UP (ref 0–0)
NRBC BLD-RTO: 0 /100 WBCS — SIGNIFICANT CHANGE UP (ref 0–0)
PLATELET # BLD AUTO: 274 K/UL — SIGNIFICANT CHANGE UP (ref 150–400)
POTASSIUM SERPL-MCNC: 4 MMOL/L — SIGNIFICANT CHANGE UP (ref 3.5–5.3)
POTASSIUM SERPL-SCNC: 4 MMOL/L — SIGNIFICANT CHANGE UP (ref 3.5–5.3)
PROT SERPL-MCNC: 7.3 G/DL — SIGNIFICANT CHANGE UP (ref 6–8.3)
PROTHROM AB SERPL-ACNC: 14.9 SEC — HIGH (ref 9.9–13.4)
RBC # BLD: 6.07 M/UL — HIGH (ref 4.2–5.8)
RBC # FLD: 12.3 % — SIGNIFICANT CHANGE UP (ref 10.3–14.5)
RH IG SCN BLD-IMP: POSITIVE — SIGNIFICANT CHANGE UP
SODIUM SERPL-SCNC: 142 MMOL/L — SIGNIFICANT CHANGE UP (ref 135–145)
SPECIMEN SOURCE: SIGNIFICANT CHANGE UP
TSH SERPL-MCNC: 2.86 UIU/ML — SIGNIFICANT CHANGE UP (ref 0.27–4.2)
WBC # BLD: 8.42 K/UL — SIGNIFICANT CHANGE UP (ref 3.8–10.5)
WBC # FLD AUTO: 8.42 K/UL — SIGNIFICANT CHANGE UP (ref 3.8–10.5)

## 2024-10-31 PROCEDURE — 93010 ELECTROCARDIOGRAM REPORT: CPT

## 2024-10-31 PROCEDURE — 99231 SBSQ HOSP IP/OBS SF/LOW 25: CPT

## 2024-10-31 RX ORDER — INFLUENZA A VIRUS A/IDAHO/07/2018 (H1N1) ANTIGEN (MDCK CELL DERIVED, PROPIOLACTONE INACTIVATED, INFLUENZA A VIRUS A/INDIANA/08/2018 (H3N2) ANTIGEN (MDCK CELL DERIVED, PROPIOLACTONE INACTIVATED), INFLUENZA B VIRUS B/SINGAPORE/INFTT-16-0610/2016 ANTIGEN (MDCK CELL DERIVED, PROPIOLACTONE INACTIVATED), INFLUENZA B VIRUS B/IOWA/06/2017 ANTIGEN (MDCK CELL DERIVED, PROPIOLACTONE INACTIVATED) 15; 15; 15; 15 UG/.5ML; UG/.5ML; UG/.5ML; UG/.5ML
0.5 INJECTION, SUSPENSION INTRAMUSCULAR ONCE
Refills: 0 | Status: DISCONTINUED | OUTPATIENT
Start: 2024-10-31 | End: 2024-11-02

## 2024-10-31 RX ADMIN — LISINOPRIL 2.5 MILLIGRAM(S): 5 TABLET ORAL at 05:41

## 2024-10-31 RX ADMIN — APIXABAN 5 MILLIGRAM(S): 2.5 TABLET, FILM COATED ORAL at 05:41

## 2024-10-31 RX ADMIN — ATORVASTATIN CALCIUM 20 MILLIGRAM(S): 80 TABLET, FILM COATED ORAL at 00:10

## 2024-10-31 RX ADMIN — APIXABAN 5 MILLIGRAM(S): 2.5 TABLET, FILM COATED ORAL at 17:26

## 2024-10-31 RX ADMIN — METOPROLOL SUCCINATE 100 MILLIGRAM(S): 50 TABLET, EXTENDED RELEASE ORAL at 17:26

## 2024-10-31 RX ADMIN — Medication 50 MILLIGRAM(S): at 05:42

## 2024-10-31 RX ADMIN — METOPROLOL SUCCINATE 100 MILLIGRAM(S): 50 TABLET, EXTENDED RELEASE ORAL at 05:41

## 2024-10-31 RX ADMIN — ATORVASTATIN CALCIUM 20 MILLIGRAM(S): 80 TABLET, FILM COATED ORAL at 22:48

## 2024-11-01 PROBLEM — I48.91 UNSPECIFIED ATRIAL FIBRILLATION: Chronic | Status: ACTIVE | Noted: 2024-10-30

## 2024-11-01 LAB
ADD ON TEST-SPECIMEN IN LAB: SIGNIFICANT CHANGE UP
ALBUMIN SERPL ELPH-MCNC: 3.9 G/DL — SIGNIFICANT CHANGE UP (ref 3.3–5)
ALP SERPL-CCNC: 91 U/L — SIGNIFICANT CHANGE UP (ref 40–120)
ALT FLD-CCNC: 35 U/L — SIGNIFICANT CHANGE UP (ref 4–41)
ANION GAP SERPL CALC-SCNC: 15 MMOL/L — HIGH (ref 7–14)
AST SERPL-CCNC: 19 U/L — SIGNIFICANT CHANGE UP (ref 4–40)
BASOPHILS # BLD AUTO: 0.04 K/UL — SIGNIFICANT CHANGE UP (ref 0–0.2)
BASOPHILS NFR BLD AUTO: 0.5 % — SIGNIFICANT CHANGE UP (ref 0–2)
BILIRUB SERPL-MCNC: 0.6 MG/DL — SIGNIFICANT CHANGE UP (ref 0.2–1.2)
BUN SERPL-MCNC: 16 MG/DL — SIGNIFICANT CHANGE UP (ref 7–23)
CALCIUM SERPL-MCNC: 9.3 MG/DL — SIGNIFICANT CHANGE UP (ref 8.4–10.5)
CHLORIDE SERPL-SCNC: 103 MMOL/L — SIGNIFICANT CHANGE UP (ref 98–107)
CO2 SERPL-SCNC: 21 MMOL/L — LOW (ref 22–31)
CREAT SERPL-MCNC: 0.97 MG/DL — SIGNIFICANT CHANGE UP (ref 0.5–1.3)
EGFR: 103 ML/MIN/1.73M2 — SIGNIFICANT CHANGE UP
EGFR: 103 ML/MIN/1.73M2 — SIGNIFICANT CHANGE UP
EOSINOPHIL # BLD AUTO: 0.35 K/UL — SIGNIFICANT CHANGE UP (ref 0–0.5)
EOSINOPHIL NFR BLD AUTO: 4 % — SIGNIFICANT CHANGE UP (ref 0–6)
GLUCOSE SERPL-MCNC: 85 MG/DL — SIGNIFICANT CHANGE UP (ref 70–99)
HCT VFR BLD CALC: 47.7 % — SIGNIFICANT CHANGE UP (ref 39–50)
HGB BLD-MCNC: 16.2 G/DL — SIGNIFICANT CHANGE UP (ref 13–17)
IANC: 5.04 K/UL — SIGNIFICANT CHANGE UP (ref 1.8–7.4)
IMM GRANULOCYTES NFR BLD AUTO: 0.1 % — SIGNIFICANT CHANGE UP (ref 0–0.9)
LYMPHOCYTES # BLD AUTO: 2.83 K/UL — SIGNIFICANT CHANGE UP (ref 1–3.3)
LYMPHOCYTES # BLD AUTO: 32 % — SIGNIFICANT CHANGE UP (ref 13–44)
MAGNESIUM SERPL-MCNC: 2.1 MG/DL — SIGNIFICANT CHANGE UP (ref 1.6–2.6)
MCHC RBC-ENTMCNC: 27.5 PG — SIGNIFICANT CHANGE UP (ref 27–34)
MCHC RBC-ENTMCNC: 34 G/DL — SIGNIFICANT CHANGE UP (ref 32–36)
MCV RBC AUTO: 81 FL — SIGNIFICANT CHANGE UP (ref 80–100)
MONOCYTES # BLD AUTO: 0.58 K/UL — SIGNIFICANT CHANGE UP (ref 0–0.9)
MONOCYTES NFR BLD AUTO: 6.6 % — SIGNIFICANT CHANGE UP (ref 2–14)
NEUTROPHILS # BLD AUTO: 5.04 K/UL — SIGNIFICANT CHANGE UP (ref 1.8–7.4)
NEUTROPHILS NFR BLD AUTO: 56.8 % — SIGNIFICANT CHANGE UP (ref 43–77)
NRBC # BLD AUTO: 0 K/UL — SIGNIFICANT CHANGE UP (ref 0–0)
NRBC # BLD: 0 /100 WBCS — SIGNIFICANT CHANGE UP (ref 0–0)
NRBC # FLD: 0 K/UL — SIGNIFICANT CHANGE UP (ref 0–0)
NRBC BLD-RTO: 0 /100 WBCS — SIGNIFICANT CHANGE UP (ref 0–0)
PLATELET # BLD AUTO: 269 K/UL — SIGNIFICANT CHANGE UP (ref 150–400)
POTASSIUM SERPL-MCNC: 4.2 MMOL/L — SIGNIFICANT CHANGE UP (ref 3.5–5.3)
POTASSIUM SERPL-SCNC: 4.2 MMOL/L — SIGNIFICANT CHANGE UP (ref 3.5–5.3)
PROT SERPL-MCNC: 7.2 G/DL — SIGNIFICANT CHANGE UP (ref 6–8.3)
RBC # BLD: 5.89 M/UL — HIGH (ref 4.2–5.8)
RBC # FLD: 12.2 % — SIGNIFICANT CHANGE UP (ref 10.3–14.5)
SODIUM SERPL-SCNC: 139 MMOL/L — SIGNIFICANT CHANGE UP (ref 135–145)
WBC # BLD: 8.85 K/UL — SIGNIFICANT CHANGE UP (ref 3.8–10.5)
WBC # FLD AUTO: 8.85 K/UL — SIGNIFICANT CHANGE UP (ref 3.8–10.5)

## 2024-11-01 PROCEDURE — 93656 COMPRE EP EVAL ABLTJ ATR FIB: CPT

## 2024-11-01 PROCEDURE — 99231 SBSQ HOSP IP/OBS SF/LOW 25: CPT

## 2024-11-01 PROCEDURE — 93657 TX L/R ATRIAL FIB ADDL: CPT

## 2024-11-01 PROCEDURE — 93010 ELECTROCARDIOGRAM REPORT: CPT

## 2024-11-01 RX ORDER — ONDANSETRON HCL/PF 4 MG/2 ML
4 VIAL (ML) INJECTION ONCE
Refills: 0 | Status: COMPLETED | OUTPATIENT
Start: 2024-11-01 | End: 2024-11-01

## 2024-11-01 RX ORDER — METOCLOPRAMIDE HCL 10 MG
10 TABLET ORAL ONCE
Refills: 0 | Status: COMPLETED | OUTPATIENT
Start: 2024-11-01 | End: 2024-11-01

## 2024-11-01 RX ORDER — METOPROLOL SUCCINATE 50 MG/1
100 TABLET, EXTENDED RELEASE ORAL ONCE
Refills: 0 | Status: COMPLETED | OUTPATIENT
Start: 2024-11-01 | End: 2024-11-01

## 2024-11-01 RX ADMIN — METOPROLOL SUCCINATE 100 MILLIGRAM(S): 50 TABLET, EXTENDED RELEASE ORAL at 22:54

## 2024-11-01 RX ADMIN — APIXABAN 5 MILLIGRAM(S): 2.5 TABLET, FILM COATED ORAL at 05:58

## 2024-11-01 RX ADMIN — APIXABAN 5 MILLIGRAM(S): 2.5 TABLET, FILM COATED ORAL at 18:48

## 2024-11-01 RX ADMIN — METOPROLOL SUCCINATE 100 MILLIGRAM(S): 50 TABLET, EXTENDED RELEASE ORAL at 05:58

## 2024-11-01 RX ADMIN — Medication 4 MILLIGRAM(S): at 19:57

## 2024-11-01 RX ADMIN — Medication 4 MILLIGRAM(S): at 18:22

## 2024-11-01 RX ADMIN — Medication 10 MILLIGRAM(S): at 23:26

## 2024-11-01 RX ADMIN — Medication 500 MILLILITER(S): at 17:53

## 2024-11-01 RX ADMIN — Medication 1000 MILLILITER(S): at 19:57

## 2024-11-01 RX ADMIN — ATORVASTATIN CALCIUM 20 MILLIGRAM(S): 80 TABLET, FILM COATED ORAL at 22:52

## 2024-11-01 RX ADMIN — LISINOPRIL 2.5 MILLIGRAM(S): 5 TABLET ORAL at 10:00

## 2024-11-02 ENCOUNTER — TRANSCRIPTION ENCOUNTER (OUTPATIENT)
Age: 39
End: 2024-11-02

## 2024-11-02 VITALS — DIASTOLIC BLOOD PRESSURE: 62 MMHG | SYSTOLIC BLOOD PRESSURE: 108 MMHG | HEART RATE: 82 BPM

## 2024-11-02 LAB
ANION GAP SERPL CALC-SCNC: 14 MMOL/L — SIGNIFICANT CHANGE UP (ref 7–14)
BUN SERPL-MCNC: 18 MG/DL — SIGNIFICANT CHANGE UP (ref 7–23)
CALCIUM SERPL-MCNC: 8.7 MG/DL — SIGNIFICANT CHANGE UP (ref 8.4–10.5)
CHLORIDE SERPL-SCNC: 103 MMOL/L — SIGNIFICANT CHANGE UP (ref 98–107)
CO2 SERPL-SCNC: 21 MMOL/L — LOW (ref 22–31)
CREAT SERPL-MCNC: 1.08 MG/DL — SIGNIFICANT CHANGE UP (ref 0.5–1.3)
EGFR: 91 ML/MIN/1.73M2 — SIGNIFICANT CHANGE UP
EGFR: 91 ML/MIN/1.73M2 — SIGNIFICANT CHANGE UP
GLUCOSE SERPL-MCNC: 88 MG/DL — SIGNIFICANT CHANGE UP (ref 70–99)
HCT VFR BLD CALC: 41.7 % — SIGNIFICANT CHANGE UP (ref 39–50)
HGB BLD-MCNC: 14 G/DL — SIGNIFICANT CHANGE UP (ref 13–17)
MAGNESIUM SERPL-MCNC: 1.8 MG/DL — SIGNIFICANT CHANGE UP (ref 1.6–2.6)
MCHC RBC-ENTMCNC: 27.1 PG — SIGNIFICANT CHANGE UP (ref 27–34)
MCHC RBC-ENTMCNC: 33.6 G/DL — SIGNIFICANT CHANGE UP (ref 32–36)
MCV RBC AUTO: 80.8 FL — SIGNIFICANT CHANGE UP (ref 80–100)
NRBC # BLD AUTO: 0 K/UL — SIGNIFICANT CHANGE UP (ref 0–0)
NRBC # BLD: 0 /100 WBCS — SIGNIFICANT CHANGE UP (ref 0–0)
NRBC # FLD: 0 K/UL — SIGNIFICANT CHANGE UP (ref 0–0)
NRBC BLD-RTO: 0 /100 WBCS — SIGNIFICANT CHANGE UP (ref 0–0)
PHOSPHATE SERPL-MCNC: 3.9 MG/DL — SIGNIFICANT CHANGE UP (ref 2.5–4.5)
PLATELET # BLD AUTO: 237 K/UL — SIGNIFICANT CHANGE UP (ref 150–400)
POTASSIUM SERPL-MCNC: 4.3 MMOL/L — SIGNIFICANT CHANGE UP (ref 3.5–5.3)
POTASSIUM SERPL-SCNC: 4.3 MMOL/L — SIGNIFICANT CHANGE UP (ref 3.5–5.3)
RBC # BLD: 5.16 M/UL — SIGNIFICANT CHANGE UP (ref 4.2–5.8)
RBC # FLD: 12.7 % — SIGNIFICANT CHANGE UP (ref 10.3–14.5)
SODIUM SERPL-SCNC: 138 MMOL/L — SIGNIFICANT CHANGE UP (ref 135–145)
WBC # BLD: 8.48 K/UL — SIGNIFICANT CHANGE UP (ref 3.8–10.5)
WBC # FLD AUTO: 8.48 K/UL — SIGNIFICANT CHANGE UP (ref 3.8–10.5)

## 2024-11-02 RX ORDER — MAGNESIUM SULFATE 500 MG/ML
1 SYRINGE (ML) INJECTION ONCE
Refills: 0 | Status: COMPLETED | OUTPATIENT
Start: 2024-11-02 | End: 2024-11-02

## 2024-11-02 RX ORDER — FLECAINIDE ACETATE 50 MG
1 TABLET ORAL
Refills: 0 | DISCHARGE

## 2024-11-02 RX ADMIN — APIXABAN 5 MILLIGRAM(S): 2.5 TABLET, FILM COATED ORAL at 06:33

## 2024-11-02 RX ADMIN — Medication 100 GRAM(S): at 16:28

## 2024-11-02 RX ADMIN — APIXABAN 5 MILLIGRAM(S): 2.5 TABLET, FILM COATED ORAL at 17:39

## 2024-11-02 RX ADMIN — METOPROLOL SUCCINATE 100 MILLIGRAM(S): 50 TABLET, EXTENDED RELEASE ORAL at 17:39

## 2024-11-02 RX ADMIN — METOPROLOL SUCCINATE 100 MILLIGRAM(S): 50 TABLET, EXTENDED RELEASE ORAL at 06:33

## 2024-12-11 ENCOUNTER — APPOINTMENT (OUTPATIENT)
Dept: ELECTROPHYSIOLOGY | Facility: CLINIC | Age: 39
End: 2024-12-11

## 2025-04-21 NOTE — DISCHARGE NOTE NURSING/CASE MANAGEMENT/SOCIAL WORK - NSDCPEELIQUISDIET_GEN_ALL_CORE
Detail Level: Generalized Detail Level: Simple Detail Level: Zone Detail Level: Detailed Eat healthy foods you enjoy. Apixaban/Eliquis DOES NOT have a special diet. Limit your alcohol intake.